# Patient Record
Sex: MALE | Race: WHITE | Employment: FULL TIME | URBAN - METROPOLITAN AREA
[De-identification: names, ages, dates, MRNs, and addresses within clinical notes are randomized per-mention and may not be internally consistent; named-entity substitution may affect disease eponyms.]

---

## 2017-08-07 ENCOUNTER — OFFICE VISIT (OUTPATIENT)
Dept: ORTHOPEDIC SURGERY | Age: 32
End: 2017-08-07

## 2017-08-07 VITALS
HEART RATE: 69 BPM | SYSTOLIC BLOOD PRESSURE: 131 MMHG | WEIGHT: 197 LBS | BODY MASS INDEX: 31.66 KG/M2 | HEIGHT: 66 IN | DIASTOLIC BLOOD PRESSURE: 103 MMHG

## 2017-08-07 DIAGNOSIS — M25.552 PAIN OF BOTH HIP JOINTS: Primary | ICD-10-CM

## 2017-08-07 DIAGNOSIS — M25.551 PAIN OF BOTH HIP JOINTS: Primary | ICD-10-CM

## 2017-08-07 NOTE — PATIENT INSTRUCTIONS
Broadlink Activation    Thank you for requesting access to Broadlink. Please follow the instructions below to securely access and download your online medical record. Broadlink allows you to send messages to your doctor, view your test results, renew your prescriptions, schedule appointments, and more. How Do I Sign Up? 1. In your internet browser, go to [email or web address]  2. Click on the First Time User? Click Here link in the Sign In box. You will be redirect to the New Member Sign Up page. 3. Enter your Broadlink Access Code exactly as it appears below. You will not need to use this code after youve completed the sign-up process. If you do not sign up before the expiration date, you must request a new code. Broadlink Access Code: [email or web address]  Expires: 2017  1:36 PM (This is the date your Broadlink access code will )    4. Enter the last four digits of your Social Security Number (xxxx) and Date of Birth (mm/dd/yyyy) as indicated and click Submit. You will be taken to the next sign-up page. 5. Create a Broadlink ID. This will be your Broadlink login ID and cannot be changed, so think of one that is secure and easy to remember. 6. Create a Broadlink password. You can change your password at any time. 7. Enter your Password Reset Question and Answer. This can be used at a later time if you forget your password. 8. Enter your e-mail address. You will receive e-mail notification when new information is available in [address] 9. Click Sign Up. You can now view and download portions of your medical record. 10. Click the Download Summary menu link to download a portable copy of your medical information. Additional Information    If you have questions, please visit the Frequently Asked Questions section of the Broadlink website at [email or web address] Remember, Broadlink is NOT to be used for urgent needs. For medical emergencies, dial 911.

## 2017-09-05 ENCOUNTER — OFFICE VISIT (OUTPATIENT)
Dept: ORTHOPEDIC SURGERY | Facility: CLINIC | Age: 32
End: 2017-09-05

## 2017-09-05 VITALS
RESPIRATION RATE: 17 BRPM | WEIGHT: 197.2 LBS | BODY MASS INDEX: 31.69 KG/M2 | HEIGHT: 66 IN | DIASTOLIC BLOOD PRESSURE: 88 MMHG | SYSTOLIC BLOOD PRESSURE: 126 MMHG | TEMPERATURE: 97.5 F | HEART RATE: 64 BPM | OXYGEN SATURATION: 98 %

## 2017-09-05 DIAGNOSIS — M25.551 RIGHT HIP PAIN: Primary | ICD-10-CM

## 2017-09-05 DIAGNOSIS — M54.42 CHRONIC BILATERAL LOW BACK PAIN WITH BILATERAL SCIATICA: ICD-10-CM

## 2017-09-05 DIAGNOSIS — G89.29 CHRONIC BILATERAL LOW BACK PAIN WITH BILATERAL SCIATICA: ICD-10-CM

## 2017-09-05 DIAGNOSIS — M54.41 CHRONIC BILATERAL LOW BACK PAIN WITH BILATERAL SCIATICA: ICD-10-CM

## 2017-09-05 DIAGNOSIS — M25.552 LEFT HIP PAIN: ICD-10-CM

## 2017-09-05 RX ORDER — CHOLECALCIFEROL (VITAMIN D3) 125 MCG
CAPSULE ORAL
COMMUNITY
End: 2019-07-17

## 2017-09-05 NOTE — PROGRESS NOTES
Patient: Zechariah Beckett                MRN: [id number]       SSN: [id number]  YOB: 1985        AGE: 28 y.o. SEX: male  Body mass index is 31.83 kg/(m^2). PCP: No primary care provider on file. 09/05/17  HISTORY: I had the pleasure of reviewing Erica Fothergill. Erica Fothergill had a fall. He was working on a truck and fell about 5 to 6 feet, right on the ladder. He complains of low back pain and also hip pain. It is sometimes in the groin as well. It is sometimes laterally based. He denies fevers or chills. What bothers him most is when he actually has to bend forward and pick something up. He does not have too much pain when he rolls over on it at night. No catching, locking or giving way with regards to the hip. He does get some persistent groin pain. He has known dysplasia of the hips as well. He never had any problems with them. Arthritis does not run in his family. He is otherwise feeling well. No numbness or tingling going down the legs. PHYSICAL EXAMINATION:  On examination, he walks perfectly normally. There is no antalgic component to the gait. The low back is tender at L4-5, which is actually the largest area of tenderness seen today. The hips actually rotate fairly well without much discomfort in the groin. The hip flexor is not particularly tender. With flexion, adduction and internal rotation, there is really not too much tenderness either. He is neurologically intact. Calf is nontender. He has good musculature. He is in good physical condition. RADIOGRAPHS:  On CT scan, he has a small what appears to be osteochondroma in the iliac wing, which the radiologist feels is benign. He does have some mild hip dysplasia and some mild cam impingement as well. IMPRESSION:  My overall impression is trauma with significant low back pain and groin discomfort. PLAN:  I do not think he is having mechanical symptoms.  I will get an MRI of both hips to make sure he does not have chondrolysis or AVN or occult lesion. We could consider an intraarticular injection associated with it for an MRI arthrogram, but I think we will stick with a plain MRI currently. I would like him to get reevaluated for his low back pain, which is where he says he is the most tender today. I will see him back after the MRI of his hips, and we will proceed from there. REVIEW OF SYSTEMS:      CON: negative for weight loss, fever  EYE: negative for double vision  ENT: negative for hoarseness  RS:   negative for Tb  GI:    negative for blood in stool  :  negative for blood in urine  Other systems reviewed and noted below. Past Medical History:   Diagnosis Date    Asthma        History reviewed. No pertinent family history. Current Outpatient Prescriptions   Medication Sig Dispense Refill    naproxen sodium (ALEVE) 220 mg cap Take  by mouth.  naproxen (NAPROSYN) 250 mg tablet Take  by mouth two (2) times daily (with meals).  HYDROcodone-acetaminophen (VICODIN) 5-500 mg per tablet Take  by mouth.  valACYclovir (VALTREX) 1 g tablet Take  by mouth.  fexofenadine (ALLEGRA) 30 mg tablet Take 30 mg by mouth two (2) times a day.  clindamycin (CLEOCIN T) 1 % external solution Apply  to affected area two (2) times a day. use thin film on affected area       fluticasone (FLONASE) 50 mcg/actuation nasal spray nightly. No Known Allergies    History reviewed. No pertinent surgical history. Social History     Social History    Marital status: SINGLE     Spouse name: N/A    Number of children: N/A    Years of education: N/A     Occupational History    Not on file.      Social History Main Topics    Smoking status: Never Smoker    Smokeless tobacco: Never Used    Alcohol use No    Drug use: Not on file    Sexual activity: Not on file     Other Topics Concern    Not on file     Social History Narrative       Visit Vitals    /88    Pulse 64    Temp 97.5 °F (36.4 °C) (Oral)    Resp 17    Ht 5' 6\" (1.676 m)    Wt 197 lb 3.2 oz (89.4 kg)    SpO2 98%    BMI 31.83 kg/m2         PHYSICAL EXAMINATION:  GENERAL: Alert and oriented x3, in no acute distress, well-developed, well-nourished, afebrile. HEART: No JVD. EYES: No scleral icterus   NECK: No significant lymphadenopathy   LUNGS: No respiratory compromise or indrawing  ABDOMEN: Soft, non-tender, non-distended. Electronically signed by:  Casandra Hernandez MD

## 2017-09-07 ENCOUNTER — OFFICE VISIT (OUTPATIENT)
Dept: ORTHOPEDIC SURGERY | Age: 32
End: 2017-09-07

## 2017-09-07 VITALS
DIASTOLIC BLOOD PRESSURE: 70 MMHG | HEIGHT: 66 IN | SYSTOLIC BLOOD PRESSURE: 140 MMHG | WEIGHT: 193 LBS | TEMPERATURE: 98.1 F | BODY MASS INDEX: 31.02 KG/M2 | RESPIRATION RATE: 16 BRPM | OXYGEN SATURATION: 97 % | HEART RATE: 56 BPM

## 2017-09-07 DIAGNOSIS — M79.18 MYOFASCIAL PAIN: Primary | ICD-10-CM

## 2017-09-07 RX ORDER — BUPIVACAINE HYDROCHLORIDE 2.5 MG/ML
4 INJECTION, SOLUTION INFILTRATION; PERINEURAL ONCE
Qty: 4 ML | Refills: 0
Start: 2017-09-07 | End: 2017-09-07

## 2017-09-07 RX ORDER — BETAMETHASONE SODIUM PHOSPHATE AND BETAMETHASONE ACETATE 3; 3 MG/ML; MG/ML
12 INJECTION, SUSPENSION INTRA-ARTICULAR; INTRALESIONAL; INTRAMUSCULAR; SOFT TISSUE ONCE
Qty: 2 ML | Refills: 0
Start: 2017-09-07 | End: 2017-09-07

## 2017-09-07 RX ORDER — LIDOCAINE HYDROCHLORIDE 20 MG/ML
4 INJECTION, SOLUTION EPIDURAL; INFILTRATION; INTRACAUDAL; PERINEURAL ONCE
Qty: 4 ML | Refills: 0
Start: 2017-09-07 | End: 2017-09-07

## 2017-09-07 NOTE — PROGRESS NOTES
[address] [facility],[address]  [place], [address]  Phone: [phone]  Fax: [phone]        Germán Huang  : 1985  PCP: No primary care provider on file. 2017    NEW PATIENT      ASSESSMENT AND PLAN     Zander Welsh comes in to the office today c/o low back pain. His symptoms are likely myofascial in nature. On the examination he had localized tenderness immediately superior to the PSIS. I did dry needling in the office today which provided good twitch responses. I also did a trigger point injection that provided immediate relief. I referred him to PT with dry needling and a [facility] based program.     Pt will f/u in 6 weeks or sooner if needed. Diagnoses and all orders for this visit:    1. Myofascial pain  -     bupivacaine (MARCAINE) 0.25 % (2.5 mg/mL) soln injection; 4 mL by SubCUTAneous route once for 1 dose. -     INJECTION, BUPIVICAINE HYDRO  -     LIDOCAINE INJECTION  -     lidocaine, PF, (XYLOCAINE) 20 mg/mL (2 %) injection; 4 mL by Other route once for 1 dose. -     betamethasone (CELESTONE SOLUSPAN) 6 mg/mL injection; 2 mL by IntraMUSCular route once for 1 dose.  -     BETAMETHASONE ACETATE & SODIUM PHOSPHATE INJECTION 6 MG  -     05531 - INJECT TRIGGER POINTS, > 3  -     REFERRAL TO PHYSICAL THERAPY       Follow-up Disposition:  Return in about 6 weeks (around 10/19/2017), or if symptoms worsen or fail to improve. CHIEF COMPLAINT  Zander Welsh is seen today in consultation at the request of No primary care provider on file. for complaints of low back pain. HISTORY OF PRESENT ILLNESS  Zander Welsh is a 28 y.o. male c/o pain in the lower lumbar region due to a fall at work in 2016. He states that he was on a ladder approximately 5 feet in the air when a step broke and he backwards onto a ladder. He reports that his pain is worse with bending forward.  He was treated by Dr. Aidan Caraballo who informed him that his symptoms were due to hip dysplasia. He was also evaluated by [facility] who treated him to PT with approximaetly 16 sessions which did not improve his symptoms. He reports a tingling sensation along the anterior aspect of the right lower extremity. He recently had a thoracic and lumbar MRI which did not show significant evidence of radiculopathy or stenosis. Pt denies any fevers, chills, nausea, vomiting. Pt denies any chest pain and shortness of breath. Pt denies any ear, nose, and throat problems. Pt denies any fecal or urinary incontinence. 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 PAST MEDICAL HISTORY   Past Medical History:   Diagnosis Date    Asthma        No past surgical history on file. MEDICATIONS      Current Outpatient Prescriptions   Medication Sig Dispense Refill    naproxen sodium (ALEVE) 220 mg cap Take  by mouth.  naproxen (NAPROSYN) 250 mg tablet Take  by mouth two (2) times daily (with meals).  HYDROcodone-acetaminophen (VICODIN) 5-500 mg per tablet Take  by mouth.  valACYclovir (VALTREX) 1 g tablet Take  by mouth.  fexofenadine (ALLEGRA) 30 mg tablet Take 30 mg by mouth two (2) times a day.  clindamycin (CLEOCIN T) 1 % external solution Apply  to affected area two (2) times a day. use thin film on affected area       fluticasone (FLONASE) 50 mcg/actuation nasal spray nightly. ALLERGIES  No Known Allergies       SOCIAL HISTORY    Social History     Social History    Marital status: SINGLE     Spouse name: N/A    Number of children: N/A    Years of education: N/A     Social History Main Topics    Smoking status: Never Smoker    Smokeless tobacco: Never Used    Alcohol use No    Drug use: None    Sexual activity: Not Asked     Other Topics Concern    None     Social History Narrative       FAMILY HISTORY  No family history on file.       REVIEW OF SYSTEMS  Review of Systems Constitutional: Negative for chills, diaphoresis, fever, malaise/fatigue and weight loss. Respiratory: Negative for shortness of breath. Cardiovascular: Negative for chest pain and leg swelling. Gastrointestinal: Negative for constipation, nausea and vomiting. Neurological: Negative for dizziness, tingling, seizures, loss of consciousness, weakness and headaches. Psychiatric/Behavioral: The patient does not have insomnia. PHYSICAL EXAMINATION  Visit Vitals    /70 (BP 1 Location: Left arm, BP Patient Position: Sitting)    Pulse (!) 56    Temp 98.1 °F (36.7 °C) (Oral)    Resp 16    Ht 5' 6\" (1.676 m)    Wt 193 lb (87.5 kg)    SpO2 97%    BMI 31.15 kg/m2         Pain Assessment  9/5/2017   Location of Pain Hip   Location Modifiers Right   Severity of Pain 5   Quality of Pain Aching;Popping;Burning   Quality of Pain Comment -   Duration of Pain Persistent   Frequency of Pain Constant   Aggravating Factors Walking;Standing;Bending   Limiting Behavior Yes   Relieving Factors Nothing   Result of Injury Yes   Work-Related Injury Yes   Type of Injury Fall         Constitutional:  Well developed, well nourished, in no acute distress. Psychiatric: Affect and mood are appropriate. HEENT: Normocephalic, atraumatic. Extraocular movements intact. Integumentary: No rashes or abrasions noted on exposed areas. Cardiovascular: Regular rate and rhythm. Pulmonary: Clear to auscultation bilaterally. SPINE/MUSCULOSKELETAL EXAM    Cervical spine:  Neck is midline. Normal muscle tone. No focal atrophy is noted. ROM pain free. Shoulder ROM intact. No tenderness to palpation. Negative Spurling's sign. Negative Tinel's sign. Negative Campbell's sign. Sensation in the bilateral arms grossly intact to light touch. Lumbar spine:  No rash, ecchymosis, or gross obliquity. No fasciculations. No focal atrophy is noted. No pain with hip ROM.    Full range of motion. Tenderness to palpation  No tenderness to palpation at the sciatic notch. SI joints non-tender. Negative DAVID test   Positive right P4 test   Negative Compression test  Positive Distraction test  Trochanters non tender. Sensation in the bilateral legs grossly intact to light touch. MOTOR:      Biceps  Triceps Deltoids Wrist Ext Wrist Flex Hand Intrin   Right 5/5 5/5 5/5 5/5 5/5 5/5   Left 5/5 5/5 5/5 5/5 5/5 5/5             Hip Flex  Quads Hamstrings Ankle DF EHL Ankle PF   Right 5/5 5/5 5/5 5/5 5/5 5/5   Left 5/5 5/5 5/5 5/5 5/5 5/5     DTRs are 2+ biceps, triceps, brachioradialis, patella, and Achilles. Negative Straight Leg raise. Squat not tested. No difficulty with tandem gait. Ambulation without assistive device. FWB. RADIOGRAPHS  Thoracic MRI images taken on 02/17/2017 personally reviewed with patient:  Impression:   1. No acute compression deformity  2. Normal spinal cord signal.   3. No significant central canal or foraminal stenosis at any level. Lumbar MRI images taken on 02/17/2017 personally reviewed with patient:  Impression:   No focal disc protrusion. No significant central canal or foraminal stenosis at any level.  reviewed    Mr. Sanjeev Salcedo has a reminder for a \"due or due soon\" health maintenance. I have asked that he contact his primary care provider for follow-up on this health maintenance. This plan was reviewed with the patient and patient agrees. All questions were answered. More than half of this visit today was spent on counseling. Written by Ayush Cadena, as dictated by Dr. Rafael Jon. I, Dr. Rafael Jon, confirm that all documentation is accurate.

## 2017-09-07 NOTE — MR AVS SNAPSHOT
Visit Information Date & Time Provider Department Dept. Phone Encounter #  
 9/7/2017  8:45 AM Jolie Paredes MD [facility] [phone] [id number] Follow-up Instructions Return in about 6 weeks (around 10/19/2017), or if symptoms worsen or fail to improve. Upcoming Health Maintenance Date Due Pneumococcal 19-64 Medium Risk (1 of 1 - PPSV23) 3/2/2004 DTaP/Tdap/Td series (1 - Tdap) 3/2/2006 INFLUENZA AGE 9 TO ADULT 8/1/2017 Allergies as of 9/7/2017  Review Complete On: 9/7/2017 By: Sadiq Wilkins LPN No Known Allergies Current Immunizations  Never Reviewed No immunizations on file. Not reviewed this visit You Were Diagnosed With   
  
 Codes Comments Myofascial pain    -  Primary ICD-10-CM: M79.1 ICD-9-CM: 729.1 Vitals BP Pulse Temp Resp Height(growth percentile) Weight(growth percentile) 140/70 (BP 1 Location: Left arm, BP Patient Position: Sitting) (!) 56 98.1 °F (36.7 °C) (Oral) 16 5' 6\" (1.676 m) 193 lb (87.5 kg) SpO2 BMI Smoking Status 97% 31.15 kg/m2 Never Smoker BMI and BSA Data Body Mass Index Body Surface Area  
 31.15 kg/m 2 2.02 m 2 Preferred Pharmacy Pharmacy Name Phone [address], [address] [place] [address] [phone] Your Updated Medication List  
  
   
This list is accurate as of: 9/7/17 10:33 AM.  Always use your most recent med list.  
  
  
  
  
 ALEVE 220 mg Cap Generic drug:  naproxen sodium Take  by mouth. betamethasone 6 mg/mL injection Commonly known as:  CELESTONE SOLUSPAN  
2 mL by IntraMUSCular route once for 1 dose. bupivacaine 0.25 % (2.5 mg/mL) Soln injection Commonly known as:  MARCAINE  
4 mL by SubCUTAneous route once for 1 dose. clindamycin 1 % external solution Commonly known as:  CLEOCIN T  
 Apply  to affected area two (2) times a day. use thin film on affected area  
  
 fexofenadine 30 mg tablet Commonly known as:  Maude Lapidus Take 30 mg by mouth two (2) times a day. FLONASE 50 mcg/actuation nasal spray Generic drug:  fluticasone  
nightly. lidocaine (PF) 20 mg/mL (2 %) injection Commonly known as:  XYLOCAINE  
4 mL by Other route once for 1 dose. NAPROSYN 250 mg tablet Generic drug:  naproxen Take  by mouth two (2) times daily (with meals). VALTREX 1 gram tablet Generic drug:  valACYclovir Take  by mouth. VICODIN 5-500 mg per tablet Generic drug:  HYDROcodone-acetaminophen Take  by mouth. We Performed the Following BETAMETHASONE ACETATE & SODIUM PHOSPHATE INJECTION 3 MG EA. [ [facility]] INJECTION, BUPIVICAINE HYDRO [ [facility]] LIDOCAINE INJECTION [ [facility]] MT INJECT TRIGGER POINTS, > 3 [id number] CPT(R)] REFERRAL TO PHYSICAL THERAPY [GSS62 Custom] Comments:  
 eval and treat; piliates based; dry needling Pt has myofascial pain in the lumbar region. Follow-up Instructions Return in about 6 weeks (around 10/19/2017), or if symptoms worsen or fail to improve. Referral Information Referral ID Referred By Referred To  
  
 [id number] WILDER Ramirez Not Available Visits Status Start Date End Date 1 New Request 9/7/17 9/7/18 If your referral has a status of pending review or denied, additional information will be sent to support the outcome of this decision. Introducing [facility]! [facility] introduces [facility] patient portal. Now you can access parts of your medical record, email your doctor's office, and request medication refills online. 1. In your internet browser, go to [email or web address] 2. Click on the First Time User? Click Here link in the Sign In box. You will see the New Member Sign Up page. 3. Enter your Scloby Access Code exactly as it appears below. You will not need to use this code after youve completed the sign-up process. If you do not sign up before the expiration date, you must request a new code. · Scloby Access Code: [email or web address] Expires: 11/5/2017  1:36 PM 
 
4. Enter the last four digits of your Social Security Number (xxxx) and Date of Birth (mm/dd/yyyy) as indicated and click Submit. You will be taken to the next sign-up page. 5. Create a Scloby ID. This will be your Scloby login ID and cannot be changed, so think of one that is secure and easy to remember. 6. Create a Scloby password. You can change your password at any time. 7. Enter your Password Reset Question and Answer. This can be used at a later time if you forget your password. 8. Enter your e-mail address. You will receive e-mail notification when new information is available in [address] 9. Click Sign Up. You can now view and download portions of your medical record. 10. Click the Download Summary menu link to download a portable copy of your medical information. If you have questions, please visit the Frequently Asked Questions section of the Scloby website. Remember, Scloby is NOT to be used for urgent needs. For medical emergencies, dial 911. Now available from your iPhone and Android! Please provide this summary of care documentation to your next provider. If you have any questions after today's visit, please call [phone].

## 2017-09-12 ENCOUNTER — TELEPHONE (OUTPATIENT)
Dept: ORTHOPEDIC SURGERY | Age: 32
End: 2017-09-12

## 2017-09-12 NOTE — TELEPHONE ENCOUNTER
Requested auth from [address] [place] [address]) W/C for PT,, faxed order and office notes to her.   Auth pending

## 2017-10-19 ENCOUNTER — OFFICE VISIT (OUTPATIENT)
Dept: ORTHOPEDIC SURGERY | Age: 32
End: 2017-10-19

## 2017-10-19 VITALS
WEIGHT: 193 LBS | BODY MASS INDEX: 31.02 KG/M2 | HEIGHT: 66 IN | DIASTOLIC BLOOD PRESSURE: 83 MMHG | OXYGEN SATURATION: 98 % | SYSTOLIC BLOOD PRESSURE: 132 MMHG | RESPIRATION RATE: 18 BRPM | HEART RATE: 74 BPM | TEMPERATURE: 98.1 F

## 2017-10-19 DIAGNOSIS — M54.50 LUMBAR SPINE PAIN: ICD-10-CM

## 2017-10-19 DIAGNOSIS — M79.18 MYOFASCIAL PAIN: Primary | ICD-10-CM

## 2017-10-19 RX ORDER — GABAPENTIN 300 MG/1
300 CAPSULE ORAL 3 TIMES DAILY
Qty: 90 CAP | Refills: 2 | Status: SHIPPED | OUTPATIENT
Start: 2017-10-19 | End: 2019-07-17

## 2017-10-19 RX ORDER — PREDNISONE 10 MG/1
10 TABLET ORAL SEE ADMIN INSTRUCTIONS
Qty: 21 TAB | Refills: 0 | Status: SHIPPED | OUTPATIENT
Start: 2017-10-19 | End: 2017-11-09 | Stop reason: ALTCHOICE

## 2017-10-19 NOTE — LETTER
NOTIFICATION RETURN TO WORK 
 
10/19/2017 9:09 AM 
 
Mr. Shmuel Adamson [address] [address] To Whom It May Concern: 
 
Shmuel Adamson is currently under the care of [facility]. He was evaluated in our office on 10/19/2017. If there are questions or concerns please have the patient contact our office.  
 
 
 
Sincerely, 
 
 
Billy Velasquez MD

## 2017-10-19 NOTE — PATIENT INSTRUCTIONS
Gabapentin (By mouth)   Gabapentin (leila-a-PEN-tin)  Treats seizures and pain caused by shingles. Brand Name(s): ACTIVE-PAC with Gabapentin, Convenience Macho, Cyclo/Babs 10/300 Pack, FusePaq Fanatrex, Babs-V, Gralise, [address] Pack, Neurontin, SmartRx Babs Kit   There may be other brand names for this medicine. When This Medicine Should Not Be Used: This medicine is not right for everyone. Do not use it if you had an allergic reaction to gabapentin. How to Use This Medicine:   Capsule, Liquid, Tablet  · Take your medicine as directed. Your dose may need to be changed several times to find what works best for you. If you have epilepsy, do not allow more than 12 hours to pass between doses. · Capsule: Swallow the capsule whole with plenty of water. Do not open, crush, or chew it. · Gralise® tablet: Swallow the tablet whole . Do not crush, break, or chew it. · Neurontin® tablet: If you break a tablet into 2 pieces, use the second half as your next dose. If you don't use it within 28 days, throw it away. · Measure the oral liquid medicine with a marked measuring spoon, oral syringe, or medicine cup. · This medicine should come with a Medication Guide. Ask your pharmacist for a copy if you do not have one. · Missed dose: Take a dose as soon as you remember. If it is almost time for your next dose, wait until then and take a regular dose. Do not take extra medicine to make up for a missed dose. · Store the medicine in a closed container at room temperature, away from heat, moisture, and direct light. Store the Neurontin® oral liquid in the refrigerator. Do not freeze. Drugs and Foods to Avoid:   Ask your doctor or pharmacist before using any other medicine, including over-the-counter medicines, vitamins, and herbal products. · Some medicines can affect how gabapentin works.  Tell your doctor if you also use any of the following:   ¨ Hydrocodone  ¨ Morphine  · If you take an antacid, wait at least 2 hours before you take gabapentin. · Tell your doctor if you use anything else that makes you sleepy. Some examples are allergy medicine, narcotic pain medicine, and alcohol. Warnings While Using This Medicine:   · Tell your doctor if you are pregnant or breastfeeding, or if you have kidney problems or are receiving dialysis. Tell your doctor if you have a history of depression or mental health problems. · This medicine may increase depression or thoughts of suicide. Tell your doctor right away if you start to feel more depressed or think about hurting yourself. · This medicine may cause a serious allergic reaction called multiorgan hypersensitivity, which can damage organs and be life-threatening. · Do not stop using this medicine suddenly. Your doctor will need to slowly decrease your dose before you stop it completely. If you take this medicine to prevent seizures, your seizures may return or occur more often if you stop this medicine suddenly. · This medicine may make you dizzy or drowsy. Do not drive or do anything else that could be dangerous until you know how this medicine affects you. · Tell any doctor or dentist who treats you that you are using this medicine. This medicine may affect certain medical test results. · Your doctor will check your progress and the effects of this medicine at regular visits. Keep all appointments. · Keep all medicine out of the reach of children. Never share your medicine with anyone.   Possible Side Effects While Using This Medicine:   Call your doctor right away if you notice any of these side effects:  · Allergic reaction: Itching or hives, swelling in your face or hands, swelling or tingling in your mouth or throat, chest tightness, trouble breathing  · Behavior problems, aggression, restlessness, trouble concentrating, moodiness (especially in children)  · Blistering, peeling, red skin rash  · Change in how much or how often you urinate, bloody or cloudy urine,  · Chest pain, fast heartbeat, trouble breathing  · Dark urine or pale stools, nausea, vomiting, loss of appetite, stomach pain, yellow skin or eyes  · Fever, rash, swollen or tender glands in the neck, armpit, or groin  · Problems with coordination, shakiness, unsteadiness  · Rapid weight gain, swelling in your hands, ankles, or feet  · Unusual moods or behaviors, thoughts of hurting yourself, feeling depressed  If you notice these less serious side effects, talk with your doctor:   · Dizziness, drowsiness, sleepiness, tiredness  If you notice other side effects that you think are caused by this medicine, tell your doctor. Call your doctor for medical advice about side effects. You may report side effects to FDA at 1-628-FDA-4328  © 2017 [address] [employer] Information is for End User's use only and may not be sold, redistributed or otherwise used for commercial purposes. The above information is an  only. It is not intended as medical advice for individual conditions or treatments. Talk to your doctor, nurse or pharmacist before following any medical regimen to see if it is safe and effective for you. Gabapentin (Neurontin) 300 mg three times a day (TID) daily instructions:       Morning Afternoon Night   Week 1 - - 1 pill   Week 2 1 pill - 1 pill   Week 3 and onwards 1 pill 1 pill 1 pill     Week 1: Take one pill each night. Week 2: Take one pill in the morning and one pill at night. Week 3 and onwards: Take one pill in the morning, one pill in the afternoon, and one pill at night. Continue taking this dose each day.

## 2017-10-19 NOTE — MR AVS SNAPSHOT
Visit Information Date & Time Provider Department Dept. Phone Encounter #  
 10/19/2017  8:30 AM Ani Trujillo MD [facility]  [id number] Follow-up Instructions Return in about 3 weeks (around 11/9/2017), or if symptoms worsen or fail to improve. Follow-up and Disposition History Upcoming Health Maintenance Date Due Pneumococcal 19-64 Medium Risk (1 of 1 - PPSV23) 3/2/2004 DTaP/Tdap/Td series (1 - Tdap) 3/2/2006 INFLUENZA AGE 9 TO ADULT 8/1/2017 Allergies as of 10/19/2017  Review Complete On: 10/19/2017 By: Ani Trujillo MD  
 No Known Allergies Current Immunizations  Never Reviewed No immunizations on file. Not reviewed this visit You Were Diagnosed With   
  
 Codes Comments Myofascial pain    -  Primary ICD-10-CM: M79.1 ICD-9-CM: 729.1 Lumbar spine pain     ICD-10-CM: M54.5 ICD-9-CM: 724.2 Vitals BP Pulse Temp Resp Height(growth percentile) Weight(growth percentile) 132/83 74 98.1 °F (36.7 °C) (Oral) 18 5' 6\" (1.676 m) 193 lb (87.5 kg) SpO2 BMI Smoking Status 98% 31.15 kg/m2 Never Smoker BMI and BSA Data Body Mass Index Body Surface Area  
 31.15 kg/m 2 2.02 m 2 Preferred Pharmacy Pharmacy Name Phone [address], [address] [place] [address] [phone] Your Updated Medication List  
  
   
This list is accurate as of: 10/19/17  9:19 AM.  Always use your most recent med list.  
  
  
  
  
 ALEVE 220 mg Cap Generic drug:  naproxen sodium Take  by mouth. clindamycin 1 % external solution Commonly known as:  CLEOCIN T Apply  to affected area two (2) times a day. use thin film on affected area  
  
 fexofenadine 30 mg tablet Commonly known as:  Matteoivy Acevedo Take 30 mg by mouth two (2) times a day. FLONASE 50 mcg/actuation nasal spray Generic drug:  fluticasone  
nightly. gabapentin 300 mg capsule Commonly known as:  NEURONTIN Take 1 Cap by mouth three (3) times daily. NAPROSYN 250 mg tablet Generic drug:  naproxen Take  by mouth two (2) times daily (with meals). predniSONE 10 mg dose pack Commonly known as:  STERAPRED DS Take 1 Tab by mouth See Admin Instructions. See administration instruction per 10mg dose pack VALTREX 1 gram tablet Generic drug:  valACYclovir Take  by mouth. VICODIN 5-500 mg per tablet Generic drug:  HYDROcodone-acetaminophen Take  by mouth. Prescriptions Sent to Pharmacy Refills  
 gabapentin (NEURONTIN) 300 mg capsule 2 Sig: Take 1 Cap by mouth three (3) times daily. Class: Normal  
 Pharmacy: [address] Ph #: [phone] Route: Oral  
 predniSONE (STERAPRED DS) 10 mg dose pack 0 Sig: Take 1 Tab by mouth See Admin Instructions. See administration instruction per 10mg dose pack Class: Normal  
 Pharmacy: [address] Ph #: [phone] Route: Oral  
  
Follow-up Instructions Return in about 3 weeks (around 11/9/2017), or if symptoms worsen or fail to improve. Patient Instructions Gabapentin (By mouth) Gabapentin (leila-a-PEN-tin) Treats seizures and pain caused by shingles. Brand Name(s): ACTIVE-PAC with Gabapentin, Convenience Macho, Cyclo/Babs 10/300 Pack, [employer], Babs-V, Gralise, Gralise Starter Pack, Neurontin, [employer] Kit There may be other brand names for this medicine. When This Medicine Should Not Be Used: This medicine is not right for everyone. Do not use it if you had an allergic reaction to gabapentin. How to Use This Medicine:  
Capsule, Liquid, Tablet · Take your medicine as directed. Your dose may need to be changed several times to find what works best for you.  If you have epilepsy, do not allow more than 12 hours to pass between doses. · Capsule: Swallow the capsule whole with plenty of water. Do not open, crush, or chew it. · Gralise® tablet: Swallow the tablet whole . Do not crush, break, or chew it. · Neurontin® tablet: If you break a tablet into 2 pieces, use the second half as your next dose. If you don't use it within 28 days, throw it away. · Measure the oral liquid medicine with a marked measuring spoon, oral syringe, or medicine cup. · This medicine should come with a Medication Guide. Ask your pharmacist for a copy if you do not have one. · Missed dose: Take a dose as soon as you remember. If it is almost time for your next dose, wait until then and take a regular dose. Do not take extra medicine to make up for a missed dose. · Store the medicine in a closed container at room temperature, away from heat, moisture, and direct light. Store the Neurontin® oral liquid in the refrigerator. Do not freeze. Drugs and Foods to Avoid: Ask your doctor or pharmacist before using any other medicine, including over-the-counter medicines, vitamins, and herbal products. · Some medicines can affect how gabapentin works. Tell your doctor if you also use any of the following: ¨ Hydrocodone ¨ Morphine · If you take an antacid, wait at least 2 hours before you take gabapentin. · Tell your doctor if you use anything else that makes you sleepy. Some examples are allergy medicine, narcotic pain medicine, and alcohol. Warnings While Using This Medicine: · Tell your doctor if you are pregnant or breastfeeding, or if you have kidney problems or are receiving dialysis. Tell your doctor if you have a history of depression or mental health problems. · This medicine may increase depression or thoughts of suicide. Tell your doctor right away if you start to feel more depressed or think about hurting yourself.  
· This medicine may cause a serious allergic reaction called multiorgan hypersensitivity, which can damage organs and be life-threatening. · Do not stop using this medicine suddenly. Your doctor will need to slowly decrease your dose before you stop it completely. If you take this medicine to prevent seizures, your seizures may return or occur more often if you stop this medicine suddenly. · This medicine may make you dizzy or drowsy. Do not drive or do anything else that could be dangerous until you know how this medicine affects you. · Tell any doctor or dentist who treats you that you are using this medicine. This medicine may affect certain medical test results. · Your doctor will check your progress and the effects of this medicine at regular visits. Keep all appointments. · Keep all medicine out of the reach of children. Never share your medicine with anyone. Possible Side Effects While Using This Medicine:  
Call your doctor right away if you notice any of these side effects: · Allergic reaction: Itching or hives, swelling in your face or hands, swelling or tingling in your mouth or throat, chest tightness, trouble breathing · Behavior problems, aggression, restlessness, trouble concentrating, moodiness (especially in children) · Blistering, peeling, red skin rash · Change in how much or how often you urinate, bloody or cloudy urine, 
· Chest pain, fast heartbeat, trouble breathing · Dark urine or pale stools, nausea, vomiting, loss of appetite, stomach pain, yellow skin or eyes · Fever, rash, swollen or tender glands in the neck, armpit, or groin · Problems with coordination, shakiness, unsteadiness · Rapid weight gain, swelling in your hands, ankles, or feet · Unusual moods or behaviors, thoughts of hurting yourself, feeling depressed If you notice these less serious side effects, talk with your doctor: · Dizziness, drowsiness, sleepiness, tiredness If you notice other side effects that you think are caused by this medicine, tell your doctor. Call your doctor for medical advice about side effects. You may report side effects to FDA at 7-926-FDA-5763 © 2017 [address] [employer] Information is for End User's use only and may not be sold, redistributed or otherwise used for commercial purposes. The above information is an  only. It is not intended as medical advice for individual conditions or treatments. Talk to your doctor, nurse or pharmacist before following any medical regimen to see if it is safe and effective for you. Gabapentin (Neurontin) 300 mg three times a day (TID) daily instructions: 
 
 
 Morning Afternoon Night Week 1 - - 1 pill Week 2 1 pill - 1 pill Week 3 and onwards 1 pill 1 pill 1 pill Week 1: Take one pill each night. Week 2: Take one pill in the morning and one pill at night. Week 3 and onwards: Take one pill in the morning, one pill in the afternoon, and one pill at night. Continue taking this dose each day. Introducing [facility]! Erick Fernández introduces Swype patient portal. Now you can access parts of your medical record, email your doctor's office, and request medication refills online. 1. In your internet browser, go to [email or web address] 2. Click on the First Time User? Click Here link in the Sign In box. You will see the New Member Sign Up page. 3. Enter your Swype Access Code exactly as it appears below. You will not need to use this code after youve completed the sign-up process. If you do not sign up before the expiration date, you must request a new code. · Swype Access Code: [email or web address] Expires: 11/5/2017  1:36 PM 
 
4. Enter the last four digits of your Social Security Number (xxxx) and Date of Birth (mm/dd/yyyy) as indicated and click Submit. You will be taken to the next sign-up page. 5. Create a Swype ID.  This will be your Swype login ID and cannot be changed, so think of one that is secure and easy to remember. 6. Create a Wi[employer]People password. You can change your password at any time. 7. Enter your Password Reset Question and Answer. This can be used at a later time if you forget your password. 8. Enter your e-mail address. You will receive e-mail notification when new information is available in [address] 9. Click Sign Up. You can now view and download portions of your medical record. 10. Click the Download Summary menu link to download a portable copy of your medical information. If you have questions, please visit the Frequently Asked Questions section of the [employer] website. Remember, [employer]People is NOT to be used for urgent needs. For medical emergencies, dial 911. Now available from your iPhone and Android! Please provide this summary of care documentation to your next provider. If you have any questions after today's visit, please call [phone].

## 2017-10-19 NOTE — PROGRESS NOTES
[address], [address]  [address]  Phone: [phone]  Fax: [phone]        Cortez Leroy  : 1985  PCP: No primary care provider on file. 10/19/2017    PROGRESS NOTE      ASSESSMENT AND PLAN    Ayla Saavedra comes in to the office today for a PT f/u. His symptoms continue to present as myofascial pain. On the examination today, he had a negative sitting slump test and straight leg raise. He continues to have radicular symptoms that could be related to lumbar radiculopathy or myofascial pain with radiation from trigger points. Given that his symptoms are worse through the night, this could represent nerve irritation. Lumbar MRI is not concordant with this diagnosis. I prescribed him a Prednisone 10 mg dose pack and Gabapentin 300 mg TID. He will also continue with the current plan of care at PT. Pt will f/u in 3 weeks or sooner as needed. Diagnoses and all orders for this visit:    1. Myofascial pain  -     gabapentin (NEURONTIN) 300 mg capsule; Take 1 Cap by mouth three (3) times daily. -     predniSONE (STERAPRED DS) 10 mg dose pack; Take 1 Tab by mouth See Admin Instructions. See administration instruction per 10mg dose pack    2. Lumbar spine pain  -     gabapentin (NEURONTIN) 300 mg capsule; Take 1 Cap by mouth three (3) times daily. -     predniSONE (STERAPRED DS) 10 mg dose pack; Take 1 Tab by mouth See Admin Instructions. See administration instruction per 10mg dose pack         Follow-up Disposition:  Return in about 3 weeks (around 2017), or if symptoms worsen or fail to improve. HISTORY OF PRESENT ILLNESS  Ayla Saavedra is a 28 y.o. male. A&P / HPI from 2017:  Ayla Saavedra comes in to the office today c/o low back pain.      His symptoms are likely myofascial in nature.  On the examination he had localized tenderness immediately superior to the PSIS.      I did dry needling in the office today which provided good twitch responses. I also did a trigger point injection that provided immediate relief. I referred him to PT with dry needling and a [facility] based program.     HISTORY OF PRESENT ILLNESS  Margoth Geronimo is a 28 y.o. male c/o pain in the lower lumbar region due to a fall at work in December 2016. He states that he was on a ladder approximately 5 feet in the air when a step broke and he backwards onto a ladder. He reports that his pain is worse with bending forward. He was treated by [facility] SARAH CHOWDHURY [facility] who informed him that his symptoms were due to hip dysplasia. He was also evaluated by Santhosh Ha who treated him to PT with approximaetly 16 sessions which did not improve his symptoms. He reports a tingling sensation along the anterior aspect of the right lower extremity. He recently had a thoracic and lumbar MRI which did not show significant evidence of radiculopathy or stenosis.      Pt denies any fevers, chills, nausea, vomiting. Pt denies any chest pain and shortness of breath. Pt denies any ear, nose, and throat problems. Pt denies any fecal or urinary incontinence.      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        Updates from 10/19/17:  Pt presents for a PT f/u. He just completed his PT eval. He reports the exercises provided immediate relief. He reports his symptoms have not changed since his last office visit. The dry needling increased his pain for approximately 3 days and then provided moderate relief. He describes his relief as \"numbness. \" He reports radicular symptoms along the posterior aspect of the right lower extremity that is worse when he is sleeping. PAST MEDICAL HISTORY   Past Medical History:   Diagnosis Date    Asthma        No past surgical history on file. Ethelene Gauss MEDICATIONS      Current Outpatient Prescriptions   Medication Sig Dispense Refill    naproxen sodium (ALEVE) 220 mg cap Take  by mouth.       naproxen (NAPROSYN) 250 mg tablet Take  by mouth two (2) times daily (with meals).  HYDROcodone-acetaminophen (VICODIN) 5-500 mg per tablet Take  by mouth.  valACYclovir (VALTREX) 1 g tablet Take  by mouth.  fexofenadine (ALLEGRA) 30 mg tablet Take 30 mg by mouth two (2) times a day.  clindamycin (CLEOCIN T) 1 % external solution Apply  to affected area two (2) times a day. use thin film on affected area       fluticasone (FLONASE) 50 mcg/actuation nasal spray nightly. ALLERGIES  No Known Allergies       SOCIAL HISTORY    Social History     Social History    Marital status: SINGLE     Spouse name: N/A    Number of children: N/A    Years of education: N/A     Social History Main Topics    Smoking status: Never Smoker    Smokeless tobacco: Never Used    Alcohol use No    Drug use: Not on file    Sexual activity: Not on file     Other Topics Concern    Not on file     Social History Narrative       FAMILY HISTORY  No family history on file. REVIEW OF SYSTEMS  Review of Systems   Constitutional: Negative for chills, diaphoresis, fever, malaise/fatigue and weight loss. Respiratory: Negative for shortness of breath. Cardiovascular: Negative for chest pain and leg swelling. Gastrointestinal: Negative for constipation, nausea and vomiting. Neurological: Negative for dizziness, tingling, seizures, loss of consciousness, weakness and headaches. Psychiatric/Behavioral: The patient does not have insomnia.            PHYSICAL EXAMINATION  Visit Vitals    /83    Pulse 74    Temp 98.1 °F (36.7 °C) (Oral)    Resp 18    Ht 5' 6\" (1.676 m)    Wt 193 lb (87.5 kg)    SpO2 98%    BMI 31.15 kg/m2       Pain Assessment  9/5/2017   Location of Pain Hip   Location Modifiers Right   Severity of Pain 5   Quality of Pain Aching;Popping;Burning   Quality of Pain Comment -   Duration of Pain Persistent   Frequency of Pain Constant   Aggravating Factors Walking;Standing;Bending   Limiting Behavior Yes   Relieving Factors Nothing   Result of Injury Yes   Work-Related Injury Yes   Type of Injury Fall           Constitutional:  Well developed, well nourished, in no acute distress. Psychiatric: Affect and mood are appropriate. Integumentary: No rashes or abrasions noted on exposed areas. SPINE/MUSCULOSKELETAL EXAM    Cervical spine:  Neck is midline. Normal muscle tone. No focal atrophy is noted. ROM pain free. Shoulder ROM intact. No tenderness to palpation. Negative Spurling's sign. Negative Tinel's sign. Negative Campbell's sign.       Sensation in the bilateral arms grossly intact to light touch.      Lumbar spine:  No rash, ecchymosis, or gross obliquity. No fasciculations. No focal atrophy is noted. No pain with hip ROM. Full range of motion. Tenderness to palpation  No tenderness to palpation at the sciatic notch. SI joints non-tender. Negative DAVID test   Positive right P4 test   Negative Compression test  Positive Distraction test  Trochanters non tender.      Sensation in the bilateral legs grossly intact to light touch. MOTOR:      Biceps  Triceps Deltoids Wrist Ext Wrist Flex Hand Intrin   Right 5/5 5/5 5/5 5/5 5/5 5/5   Left 5/5 5/5 5/5 5/5 5/5 5/5             Hip Flex  Quads Hamstrings Ankle DF EHL Ankle PF   Right 5/5 5/5 5/5 5/5 5/5 5/5   Left 5/5 5/5 5/5 5/5 5/5 5/5     DTRs are 2+ biceps, triceps, brachioradialis, patella, and Achilles.     Negative Straight Leg raise. Squat not tested. No difficulty with tandem gait.      Ambulation without assistive device. FWB.       RADIOGRAPHS  Thoracic MRI images taken on 02/17/2017 personally reviewed with patient:  Impression:   1. No acute compression deformity  2. Normal spinal cord signal.   3. No significant central canal or foraminal stenosis at any level.      Lumbar MRI images taken on 02/17/2017 personally reviewed with patient:  Impression:   No focal disc protrusion. No significant central canal or foraminal stenosis at any level.       reviewed     Mr. Julieta Mace has a reminder for a \"due or due soon\" health maintenance. I have asked that he contact his primary care provider for follow-up on this health maintenance.      This plan was reviewed with the patient and patient agrees. All questions were answered.  More than half of this visit today was spent on counseling.     Written by Erik Kline, as dictated by Dr. Jonh Treviño, Dr. Favio Rubalcava, confirm that all documentation is accurate.

## 2017-11-06 DIAGNOSIS — M25.551 RIGHT HIP PAIN: ICD-10-CM

## 2017-11-06 DIAGNOSIS — M25.552 LEFT HIP PAIN: ICD-10-CM

## 2017-11-09 ENCOUNTER — OFFICE VISIT (OUTPATIENT)
Dept: ORTHOPEDIC SURGERY | Age: 32
End: 2017-11-09

## 2017-11-09 VITALS
BODY MASS INDEX: 32.3 KG/M2 | HEIGHT: 66 IN | WEIGHT: 201 LBS | SYSTOLIC BLOOD PRESSURE: 116 MMHG | HEART RATE: 79 BPM | DIASTOLIC BLOOD PRESSURE: 89 MMHG

## 2017-11-09 DIAGNOSIS — M79.18 MYOFASCIAL PAIN: Primary | ICD-10-CM

## 2017-11-09 DIAGNOSIS — G57.01 PIRIFORMIS SYNDROME OF RIGHT SIDE: ICD-10-CM

## 2017-11-09 DIAGNOSIS — M54.50 LUMBAR SPINE PAIN: ICD-10-CM

## 2017-11-09 RX ORDER — BETAMETHASONE SODIUM PHOSPHATE AND BETAMETHASONE ACETATE 3; 3 MG/ML; MG/ML
12 INJECTION, SUSPENSION INTRA-ARTICULAR; INTRALESIONAL; INTRAMUSCULAR; SOFT TISSUE ONCE
Qty: 2 ML | Refills: 0
Start: 2017-11-09 | End: 2017-11-09

## 2017-11-09 RX ORDER — LIDOCAINE HYDROCHLORIDE 20 MG/ML
4 INJECTION, SOLUTION EPIDURAL; INFILTRATION; INTRACAUDAL; PERINEURAL ONCE
Qty: 4 ML | Refills: 0
Start: 2017-11-09 | End: 2017-11-09

## 2017-11-09 RX ORDER — BUPIVACAINE HYDROCHLORIDE 2.5 MG/ML
4 INJECTION, SOLUTION INFILTRATION; PERINEURAL ONCE
Qty: 4 ML | Refills: 0
Start: 2017-11-09 | End: 2017-11-09

## 2017-11-09 NOTE — PROGRESS NOTES
[address]  [address]  [place], [address] [place] [address] Street  Phone: [phone]  Fax: [phone]        [employer] Warner  : 1985  PCP: No primary care provider on file. 2017    PROGRESS NOTE      ASSESSMENT AND PLAN    Kassandra Caraballo comes in to the office today for a PT f/u. He has primarily been treated for lumbar myofascial pain related to a work injury. His symptoms have not changed with this course of treatment. He continues to have tenderness in the lumbar paraspinals. I performed a FADIR test with palpation to the piriformis which reproduced radicular symptoms into the RLE. I added evaluation of piriformis syndrome onto his PT regimen. I provided trigger point injections in the office today, which improved his pain from a 5/10 to a 1/10. Pt will f/u in 3 weeks or sooner as needed. Diagnoses and all orders for this visit:    1. Myofascial pain  -     bupivacaine (MARCAINE) 0.25 % (2.5 mg/mL) soln injection; 4 mL by SubCUTAneous route once for 1 dose. -     INJECTION, BUPIVICAINE HYDRO  -     LIDOCAINE INJECTION  -     lidocaine, PF, (XYLOCAINE) 20 mg/mL (2 %) injection; 4 mL by Other route once for 1 dose. -     betamethasone (CELESTONE SOLUSPAN) 6 mg/mL injection; 2 mL by IntraMUSCular route once for 1 dose.  -     BETAMETHASONE ACETATE & SODIUM PHOSPHATE INJECTION 6 MG  -     36519 - INJECT TRIGGER POINTS, > 3    2. Piriformis syndrome of right side  -     REFERRAL TO PHYSICAL THERAPY    3. Lumbar spine pain         Follow-up Disposition:  Return in about 3 weeks (around 2017), or if symptoms worsen or fail to improve. HISTORY OF PRESENT ILLNESS  Kassandra Caraballo is a 28 y.o. male. A&P / HPI from 2017:  Rushie Fallow in to the office today c/o low back pain.       His symptoms are likely myofascial in nature.  On the examination he had localized tenderness immediately superior to the PSIS.     I did dry needling in the office today which provided good twitch responses. I also did a trigger point injection that provided immediate relief. I referred him to PT with dry needling and a [facility] based program.      HISTORY OF PRESENT ILLNESS  Maranda Peters is a 28 y. o. male c/o pain in the lower lumbar region due to a fall at work in December 2016. He states that he was on a ladder approximately 5 feet in the air when a step broke and he backwards onto a ladder. He reports that his pain is worse with bending forward. He was treated by Dr. Florencio Saxena who informed him that his symptoms were due to hip dysplasia. He was also evaluated by Santhosh Ha who treated him to PT with approximaetly 16 sessions which did not improve his symptoms. He reports a tingling sensation along the anterior aspect of the right lower extremity. He recently had a thoracic and lumbar MRI which did not show significant evidence of radiculopathy or stenosis.     Pt denies any fevers, chills, nausea, vomiting. Pt denies any chest pain and shortness of breath. Pt denies any ear, nose, and throat problems. Pt denies any fecal or urinary incontinence.       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         Updates from 10/19/17:  Pt presents for a PT f/u. He just completed his PT eval. He reports the exercises provided immediate relief. He reports his symptoms have not changed since his last office visit. The dry needling increased his pain for approximately 3 days and then provided moderate relief. He describes his relief as \"numbness. \" He reports radicular symptoms along the posterior aspect of the right lower extremity that is worse when he is sleeping.        Updates from 11/09/17:  Pt presents for a PT f/u. His pain today is described as constant aching 5/10 with numbness and tingling into the posterior aspect of the RLE ending in the calf. He notes his pain is primarily located in the lumbar region. Through the session, he has been treated with a TENS unit and general strengthening. PAST MEDICAL HISTORY   Past Medical History:   Diagnosis Date    Asthma        History reviewed. No pertinent surgical history. Mely Yoder MEDICATIONS      Current Outpatient Prescriptions   Medication Sig Dispense Refill    bupivacaine (MARCAINE) 0.25 % (2.5 mg/mL) soln injection 4 mL by SubCUTAneous route once for 1 dose. 4 mL 0    lidocaine, PF, (XYLOCAINE) 20 mg/mL (2 %) injection 4 mL by Other route once for 1 dose. 4 mL 0    betamethasone (CELESTONE SOLUSPAN) 6 mg/mL injection 2 mL by IntraMUSCular route once for 1 dose. 2 mL 0    gabapentin (NEURONTIN) 300 mg capsule Take 1 Cap by mouth three (3) times daily. 90 Cap 2    naproxen sodium (ALEVE) 220 mg cap Take  by mouth.  naproxen (NAPROSYN) 250 mg tablet Take  by mouth two (2) times daily (with meals).  HYDROcodone-acetaminophen (VICODIN) 5-500 mg per tablet Take  by mouth.  valACYclovir (VALTREX) 1 g tablet Take  by mouth.  fexofenadine (ALLEGRA) 30 mg tablet Take 30 mg by mouth two (2) times a day.  clindamycin (CLEOCIN T) 1 % external solution Apply  to affected area two (2) times a day. use thin film on affected area       fluticasone (FLONASE) 50 mcg/actuation nasal spray nightly. ALLERGIES  No Known Allergies       SOCIAL HISTORY    Social History     Social History    Marital status: SINGLE     Spouse name: N/A    Number of children: N/A    Years of education: N/A     Social History Main Topics    Smoking status: Never Smoker    Smokeless tobacco: Never Used    Alcohol use No    Drug use: None    Sexual activity: Not Asked     Other Topics Concern    None     Social History Narrative       FAMILY HISTORY  History reviewed. No pertinent family history. REVIEW OF SYSTEMS  Review of Systems   Constitutional: Negative for chills, diaphoresis, fever, malaise/fatigue and weight loss.    Respiratory: Negative for shortness of breath. Cardiovascular: Negative for chest pain and leg swelling. Gastrointestinal: Negative for constipation, nausea and vomiting. Neurological: Negative for dizziness, tingling, seizures, loss of consciousness, weakness and headaches. Psychiatric/Behavioral: The patient does not have insomnia. PHYSICAL EXAMINATION  Visit Vitals    /89    Pulse 79    Ht 5' 6\" (1.676 m)    Wt 201 lb (91.2 kg)    BMI 32.44 kg/m2       Pain Assessment  11/9/2017   Location of Pain Back;Leg   Location Modifiers Right   Severity of Pain 5   Quality of Pain Aching   Quality of Pain Comment numbness   Duration of Pain Persistent   Frequency of Pain Constant   Aggravating Factors -   Limiting Behavior Some   Relieving Factors Nothing   Result of Injury Yes   Work-Related Injury Yes   Type of Injury -           Constitutional:  Well developed, well nourished, in no acute distress. Psychiatric: Affect and mood are appropriate. Integumentary: No rashes or abrasions noted on exposed areas. SPINE/MUSCULOSKELETAL EXAM    Cervical spine:  Neck is midline. Normal muscle tone. No focal atrophy is noted. ROM pain free. Shoulder ROM intact. No tenderness to palpation. Negative Spurling's sign. Negative Tinel's sign. Negative Campbell's sign.       Sensation in the bilateral arms grossly intact to light touch.       Lumbar spine:  No rash, ecchymosis, or gross obliquity. No fasciculations. No focal atrophy is noted. No pain with hip ROM. Full range of motion. Tenderness to palpation  No tenderness to palpation at the sciatic notch. SI joints non-tender. Negative DAVID test   Positive right P4 test   Negative Compression test  Positive Distraction test  Trochanters non tender.      Sensation in the bilateral legs grossly intact to light touch.     MOTOR:      Biceps  Triceps Deltoids Wrist Ext Wrist Flex Hand Intrin   Right 5/5 5/5 5/5 5/5 5/5 5/5   Left 5/5 5/5 5/5 5/5 5/5 5/5             Hip Flex  Quads Hamstrings Ankle DF EHL Ankle PF   Right 5/5 5/5 5/5 5/5 5/5 5/5   Left 5/5 5/5 5/5 5/5 5/5 5/5     DTRs are 2+ biceps, triceps, brachioradialis, patella, and Achilles.      Negative Straight Leg raise. Squat not tested. No difficulty with tandem gait.       Ambulation without assistive device. FWB.       RADIOGRAPHS  Thoracic MRI images taken on 02/17/2017 personally reviewed with patient:  Impression:   1. No acute compression deformity  2. Normal spinal cord signal.   3. No significant central canal or foraminal stenosis at any level. Lumbar MRI images taken on 02/17/2017 personally reviewed with patient:  Impression:   No focal disc protrusion. No significant central canal or foraminal stenosis at any level. [facility]  OFFICE PROCEDURE PROGRESS NOTE      PROCEDURE: In the office today after informed consent using aseptic technique, the patient was injected with a total of 2 cc of Celestone, 0.5 cc each of Lidocaine and Marcaine into his lumbar paraspinal muscles. Chart reviewed for the following:   I, Dr. Nanda Jin, have reviewed the History, Physical and updated the Allergic reactions for [employer] performed immediately prior to start of procedure:   IDr. Nanda, have performed the following reviews on [facility] prior to the start of the procedure:            * Patient was identified by name and date of birth   * Agreement on procedure being performed was verified  * Risks and Benefits explained to the patient  * Procedure site verified and marked as necessary  * Patient was positioned for comfort  * Consent was signed and verified    Date of procedure: 11/9/2017    Procedure performed by:  Ubaldo Dillon MD    Provider assisted by: None    Patient assisted by: Self    How tolerated by patient: Pt tolerated the procedure well with no complications.      reviewed      Mr. Peters has a reminder for a \"due or due soon\" health maintenance. I have asked that he contact his primary care provider for follow-up on this health maintenance.       This plan was reviewed with the patient and patient agrees. All questions were answered.  More than half of this visit today was spent on counseling.      Written by Ron Roberson, as dictated by Dr. Jenna Kincaid, Dr. Sultana Thompson, confirm that all documentation is accurate.

## 2017-11-09 NOTE — PATIENT INSTRUCTIONS
Piriformis Syndrome: Exercises  Your Care Instructions  Here are some examples of typical rehabilitation exercises for your condition. Start each exercise slowly. Ease off the exercise if you start to have pain. Your doctor or physical therapist will tell you when you can start these exercises and which ones will work best for you. How to do the exercises  Hip rotator stretch    1. Lie on your back with both knees bent and your feet flat on the floor. 2. Put the ankle of your affected leg on your opposite thigh near your knee. 3. Use your hand to gently push your knee (on your affected leg) away from your body until you feel a gentle stretch around your hip. 4. Hold the stretch for 15 to 30 seconds. 5. Repeat 2 to 4 times. 6. Switch legs and repeat steps 1 through 5. Piriformis stretch    1. Lie on your back with your legs straight. 2. Lift your affected leg and bend your knee. With your opposite hand, reach across your body, and then gently pull your knee toward your opposite shoulder. 3. Hold the stretch for 15 to 30 seconds. 4. Repeat with your other leg. 5. Repeat 2 to 4 times on each side. Lower abdominal strengthening    1. Lie on your back with your knees bent and your feet flat on the floor. 2. Tighten your belly muscles by pulling your belly button in toward your spine. 3. Lift one foot off the floor and bring your knee toward your chest, so that your knee is straight above your hip and your leg is bent like the letter \"L. \"  4. Lift the other knee up to the same position. 5. Lower one leg at a time to the starting position. 6. Keep alternating legs until you have lifted each leg 8 to 12 times. 7. Be sure to keep your belly muscles tight and your back still as you are moving your legs. Be sure to breathe normally. Follow-up care is a key part of your treatment and safety. Be sure to make and go to all appointments, and call your doctor if you are having problems.  It's also a good idea to know your test results and keep a list of the medicines you take. Where can you learn more? Go to http://susanne-radhika.info/. Enter I668 in the search box to learn more about \"Piriformis Syndrome: Exercises. \"  Current as of: March 21, 2017  Content Version: 11.4  © 3488-8273 [employer]. Care instructions adapted under license by [employer] (which disclaims liability or warranty for this information). If you have questions about a medical condition or this instruction, always ask your healthcare professional. [address] any warranty or liability for your use of this information.

## 2017-11-09 NOTE — MR AVS SNAPSHOT
Visit Information Date & Time Provider Department Dept. Phone Encounter #  
 11/9/2017  9:45 AM Lauren Mcnair MD [facility] [phone] [id number] Follow-up Instructions Return in about 3 weeks (around 11/30/2017), or if symptoms worsen or fail to improve. Your Appointments 11/17/2017  8:15 AM  
DIAG TEST F/U with Edgar Lamas MD  
[address] [facility] [address], [address] [facility]  ([facility]) Appt Note: JN KNEE MRI TEST RESULTS. [address] [facility], [address] [facility]  
[phone] [facility] Upcoming Health Maintenance Date Due Pneumococcal 19-64 Medium Risk (1 of 1 - PPSV23) 3/2/2004 DTaP/Tdap/Td series (1 - Tdap) 3/2/2006 Influenza Age 5 to Adult 8/1/2017 Allergies as of 11/9/2017  Review Complete On: 11/9/2017 By: Rupal Sousa No Known Allergies Current Immunizations  Never Reviewed No immunizations on file. Not reviewed this visit You Were Diagnosed With   
  
 Codes Comments Myofascial pain    -  Primary ICD-10-CM: M79.1 ICD-9-CM: 729.1 Piriformis syndrome of right side     ICD-10-CM: G57.01 
ICD-9-CM: 355.0 Lumbar spine pain     ICD-10-CM: M54.5 ICD-9-CM: 724.2 Vitals BP Pulse Height(growth percentile) Weight(growth percentile) BMI Smoking Status 116/89 79 5' 6\" (1.676 m) 201 lb (91.2 kg) 32.44 kg/m2 Never Smoker Vitals History BMI and BSA Data Body Mass Index Body Surface Area  
 32.44 kg/m 2 2.06 m 2 Preferred Pharmacy Pharmacy Name Phone [address], [address] [place] [address] [phone] Your Updated Medication List  
  
   
This list is accurate as of: 11/9/17 10:54 AM.  Always use your most recent med list.  
  
  
  
  
 ALEVE 220 mg Cap Generic drug:  naproxen sodium Take  by mouth. betamethasone 6 mg/mL injection Commonly known as:  CELESTONE SOLUSPAN  
2 mL by IntraMUSCular route once for 1 dose. bupivacaine 0.25 % (2.5 mg/mL) Soln injection Commonly known as:  MARCAINE  
4 mL by SubCUTAneous route once for 1 dose. clindamycin 1 % external solution Commonly known as:  CLEOCIN T Apply  to affected area two (2) times a day. use thin film on affected area  
  
 fexofenadine 30 mg tablet Commonly known as:  Ernesto Eduar Take 30 mg by mouth two (2) times a day. FLONASE 50 mcg/actuation nasal spray Generic drug:  fluticasone  
nightly.  
  
 gabapentin 300 mg capsule Commonly known as:  NEURONTIN Take 1 Cap by mouth three (3) times daily. lidocaine (PF) 20 mg/mL (2 %) injection Commonly known as:  XYLOCAINE  
4 mL by Other route once for 1 dose. NAPROSYN 250 mg tablet Generic drug:  naproxen Take  by mouth two (2) times daily (with meals). VALTREX 1 gram tablet Generic drug:  valACYclovir Take  by mouth. VICODIN 5-500 mg per tablet Generic drug:  HYDROcodone-acetaminophen Take  by mouth. We Performed the Following BETAMETHASONE ACETATE & SODIUM PHOSPHATE INJECTION 3 MG EA. [ [facility]] INJECTION, BUPIVICAINE HYDRO [ [facility]] LIDOCAINE INJECTION [ [facility]] OH INJECT TRIGGER POINTS, > 3 [id number] CPT(R)] REFERRAL TO PHYSICAL THERAPY [MZK68 Custom] Comments:  
 PIVOT PT.  
eval and treat; possible piriformis syndrome Follow-up Instructions Return in about 3 weeks (around 11/30/2017), or if symptoms worsen or fail to improve. Referral Information Referral ID Referred By Referred To  
  
 [id number] Verneda Snellen, DAVID Not Available Visits Status Start Date End Date 1 New Request 11/9/17 11/9/18 If your referral has a status of pending review or denied, additional information will be sent to support the outcome of this decision. Patient Instructions Piriformis Syndrome: Exercises Your Care Instructions Here are some examples of typical rehabilitation exercises for your condition. Start each exercise slowly. Ease off the exercise if you start to have pain. Your doctor or physical therapist will tell you when you can start these exercises and which ones will work best for you. How to do the exercises Hip rotator stretch 1. Lie on your back with both knees bent and your feet flat on the floor. 2. Put the ankle of your affected leg on your opposite thigh near your knee. 3. Use your hand to gently push your knee (on your affected leg) away from your body until you feel a gentle stretch around your hip. 4. Hold the stretch for 15 to 30 seconds. 5. Repeat 2 to 4 times. 6. Switch legs and repeat steps 1 through 5. Piriformis stretch 1. Lie on your back with your legs straight. 2. Lift your affected leg and bend your knee. With your opposite hand, reach across your body, and then gently pull your knee toward your opposite shoulder. 3. Hold the stretch for 15 to 30 seconds. 4. Repeat with your other leg. 5. Repeat 2 to 4 times on each side. Lower abdominal strengthening 1. Lie on your back with your knees bent and your feet flat on the floor. 2. Tighten your belly muscles by pulling your belly button in toward your spine. 3. Lift one foot off the floor and bring your knee toward your chest, so that your knee is straight above your hip and your leg is bent like the letter \"L. \" 
4. Lift the other knee up to the same position. 5. Lower one leg at a time to the starting position. 6. Keep alternating legs until you have lifted each leg 8 to 12 times. 7. Be sure to keep your belly muscles tight and your back still as you are moving your legs. Be sure to breathe normally. Follow-up care is a key part of your treatment and safety.  Be sure to make and go to all appointments, and call your doctor if you are having problems. It's also a good idea to know your test results and keep a list of the medicines you take. Where can you learn more? Go to http://susanne-radhika.info/. Enter P760 in the search box to learn more about \"Piriformis Syndrome: Exercises. \" Current as of: March 21, 2017 Content Version: 11.4 © 7211-5679 [employer]. Care instructions adapted under license by [employer] (which disclaims liability or warranty for this information). If you have questions about a medical condition or this instruction, always ask your healthcare professional. [place] [address] any warranty or liability for your use of this information. Introducing \[facility] [place] [facility]\"" & HEALTH SERVICES! [address] introduces [employer]to[employer]o patient portal. Now you can access parts of your medical record, email your doctor's office, and request medication refills online. 1. In your internet browser, go to [email or web address] 2. Click on the First Time User? Click Here link in the Sign In box. You will see the New Member Sign Up page. 3. Enter your ProtoGeo Access Code exactly as it appears below. You will not need to use this code after youve completed the sign-up process. If you do not sign up before the expiration date, you must request a new code. · ProtoGeo Access Code: [email or web address] Expires: 2/7/2018 10:27 AM 
 
4. Enter the last four digits of your Social Security Number (xxxx) and Date of Birth (mm/dd/yyyy) as indicated and click Submit. You will be taken to the next sign-up page. 5. Create a [employer] ID. This will be your ProtoGeo login ID and cannot be changed, so think of one that is secure and easy to remember. 6. Create a ProtoGeo password. You can change your password at any time. 7. Enter your Password Reset Question and Answer. This can be used at a later time if you forget your password. 8. Enter your e-mail address.  You will receive e-mail notification when new information is available in [employer]. 9. Click Sign Up. You can now view and download portions of your medical record. 10. Click the Download Summary menu link to download a portable copy of your medical information. If you have questions, please visit the Frequently Asked Questions section of the [employer] website. Remember, Easy Metrics is NOT to be used for urgent needs. For medical emergencies, dial 911. Now available from your iPhone and Android! Please provide this summary of care documentation to your next provider. If you have any questions after today's visit, please call [phone].

## 2017-11-17 ENCOUNTER — OFFICE VISIT (OUTPATIENT)
Dept: ORTHOPEDIC SURGERY | Age: 32
End: 2017-11-17

## 2017-11-17 VITALS
WEIGHT: 203.2 LBS | HEART RATE: 74 BPM | BODY MASS INDEX: 32.66 KG/M2 | SYSTOLIC BLOOD PRESSURE: 130 MMHG | DIASTOLIC BLOOD PRESSURE: 96 MMHG | HEIGHT: 66 IN | OXYGEN SATURATION: 98 % | TEMPERATURE: 98.1 F

## 2017-11-17 DIAGNOSIS — S73.191A TEAR OF RIGHT ACETABULAR LABRUM, INITIAL ENCOUNTER: Primary | ICD-10-CM

## 2017-11-17 DIAGNOSIS — M25.551 RIGHT HIP PAIN: ICD-10-CM

## 2017-11-17 NOTE — LETTER
11/17/2017 9:03 AM 
 
Mr. Daniel Marte [address] [address] To Whom It May Concern: 
 
Daniel Marte is currently under the care of [address] [place] [address] Kevin Escoto. He was seen in our office Friday, 11/17/17. If there are questions or concerns please have the patient contact our office. Sincerely, Griselda Handing, MD

## 2017-11-17 NOTE — PROGRESS NOTES
Patient: Tanner Archuleta                MRN: [id number]       SSN: [id number]  YOB: 1985        AGE: 28 y.o. SEX: male  Body mass index is 32.8 kg/(m^2). PCP: No primary care provider on file. 11/17/17    HISTORY: Mr. Pascual Hinojosa returns in followup. He is having no left hip pain whatsoever. It is the right hip. It hurts him at night to roll over on it. He does get persistent groin pain. He works as an , and it hurts him when he is at work as well. He has no true catching or locking. His previous x-rays were reviewed. He does have some mild dysplasia, and on the left hip, very significant CAM impingement worse on the left than on the right. Interestingly enough, he is asymptomatic on the left side. He has had some injections in the back recently, which have been quite helpful from Dr. Sydney Vieira. PHYSICAL EXAMINATION:  On examination today, he walks normally. There is no antalgic component to the gait. He has a little bit of tenderness over the greater trochanter on the right side. The low back is mildly tender. Sensation is grossly intact. The hip with flexion, adduction, and internal rotation is mildly tender in the groin area, but there is no catching or locking. 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 The right hip has mild dysplasia, still with persistent groin pain. The MRI was negative for chondrolysis or AVN or fracture with some mild mechanical symptoms. PLAN:  I would recommend an MRI arthrogram of the right hip. Surprisingly, he is not too interested in having a cortisone injection for his hip bursitis. We will see him back after the MRI arthrogram to look for labral tear on the right side.          REVIEW OF SYSTEMS:      CON: negative for weight loss, fever  EYE: negative for double vision  ENT: negative for hoarseness  RS:   negative for Tb  GI:    negative for blood in stool  :  negative for blood in urine  Other systems reviewed and noted below. Past Medical History:   Diagnosis Date    Asthma        History reviewed. No pertinent family history. Current Outpatient Prescriptions   Medication Sig Dispense Refill    gabapentin (NEURONTIN) 300 mg capsule Take 1 Cap by mouth three (3) times daily. 90 Cap 2    naproxen sodium (ALEVE) 220 mg cap Take  by mouth.  naproxen (NAPROSYN) 250 mg tablet Take  by mouth two (2) times daily (with meals).  HYDROcodone-acetaminophen (VICODIN) 5-500 mg per tablet Take  by mouth.  valACYclovir (VALTREX) 1 g tablet Take  by mouth.  fexofenadine (ALLEGRA) 30 mg tablet Take 30 mg by mouth two (2) times a day.  clindamycin (CLEOCIN T) 1 % external solution Apply  to affected area two (2) times a day. use thin film on affected area       fluticasone (FLONASE) 50 mcg/actuation nasal spray nightly. No Known Allergies    History reviewed. No pertinent surgical history. Social History     Social History    Marital status: SINGLE     Spouse name: N/A    Number of children: N/A    Years of education: N/A     Occupational History    Not on file. Social History Main Topics    Smoking status: Never Smoker    Smokeless tobacco: Never Used    Alcohol use No    Drug use: Not on file    Sexual activity: Not on file     Other Topics Concern    Not on file     Social History Narrative       Visit Vitals    BP (!) 130/96    Pulse 74    Temp 98.1 °F (36.7 °C)    Ht 5' 6\" (1.676 m)    Wt 203 lb 3.2 oz (92.2 kg)    SpO2 98%    BMI 32.8 kg/m2         PHYSICAL EXAMINATION:  GENERAL: Alert and oriented x3, in no acute distress, well-developed, well-nourished, afebrile. HEART: No JVD. EYES: No scleral icterus   NECK: No significant lymphadenopathy   LUNGS: No respiratory compromise or indrawing  ABDOMEN: Soft, non-tender, non-distended. Electronically signed by:  Christelle Luis Diaz Kraus MD

## 2017-11-28 ENCOUNTER — OFFICE VISIT (OUTPATIENT)
Dept: ORTHOPEDIC SURGERY | Age: 32
End: 2017-11-28

## 2017-11-28 VITALS
SYSTOLIC BLOOD PRESSURE: 121 MMHG | WEIGHT: 203.2 LBS | BODY MASS INDEX: 32.66 KG/M2 | HEART RATE: 87 BPM | OXYGEN SATURATION: 97 % | TEMPERATURE: 98.2 F | DIASTOLIC BLOOD PRESSURE: 80 MMHG | RESPIRATION RATE: 18 BRPM | HEIGHT: 66 IN

## 2017-11-28 DIAGNOSIS — M46.1 SACROILIITIS (HCC): Primary | ICD-10-CM

## 2017-11-28 NOTE — LETTER
NOTIFICATION RETURN TO WORK  
 
11/28/2017 3:56 PM 
 
Mr. Radha Mckeon [address] [address] To Whom It May Concern: 
 
Radha Mckeon is currently under the care of [facility]. He was evaluated in our office on 11/28/2017. If there are questions or concerns please have the patient contact our office.  
 
 
 
Sincerely, 
 
 
Pearl Woods MD

## 2017-11-28 NOTE — PROGRESS NOTES
[address]  [address], [address]  [place], [address] [place] [address] Street  Phone: [phone]  Fax: [phone]        Millie Dear  : 1985  PCP: No primary care provider on file. 2017    PROGRESS NOTE      ASSESSMENT AND PLAN    Edgard Schultz comes in to the office today for a PT f/u. The sessions have slightly improved his symptoms. His pain today is likely related to sacroiliitis. On the examination, he had a positive right DAVID, P4, distraction, and Gaenslen's test.     I referred him to get a right SI joint injection. He will continue with the current plan of care at PT along with adding SI joint exercises onto the regimen. Pt will f/u in 3 weeks or sooner as needed. Diagnoses and all orders for this visit:    1. Sacroiliitis ([facility] [address]  -     SCHEDULE SURGERY       Follow-up Disposition:  Return in about 3 weeks (around 2017), or if symptoms worsen or fail to improve. HISTORY OF PRESENT ILLNESS  Edgard Schultz is a 28 y.o. male. A&P / HPI from 2017:  Margaux Bloom in to the office today c/o low back pain.       His symptoms are likely myofascial in nature. On the examination he had localized tenderness immediately superior to the PSIS.     I did dry needling in the office today which provided good twitch responses. I also did a trigger point injection that provided immediate relief. I referred him to PT with dry needling and a [facility] based program.       HISTORY OF PRESENT ILLNESS  Roverto Peters is a 28 y. o. male c/o pain in the lower lumbar region due to a fall at work in 2016. He states that he was on a ladder approximately 5 feet in the air when a step broke and he backwards onto a ladder. He reports that his pain is worse with bending forward. He was treated by Dr. Ozzy Robertson who informed him that his symptoms were due to hip dysplasia.  [facility] him to PT with approximaetly 16 sessions which did not improve his symptoms. He reports a tingling sensation along the anterior aspect of the right lower extremity. He recently had a thoracic and lumbar MRI which did not show significant evidence of radiculopathy or stenosis.     Pt denies any fevers, chills, nausea, vomiting. Pt denies any chest pain and shortness of breath. Pt denies any ear, nose, and throat problems. Pt denies any fecal or urinary incontinence.       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         Updates from 10/19/17:  Pt presents for a PT f/u. He just completed his PT eval. He reports the exercises provided immediate relief. He reports his symptoms have not changed since his last office visit. The dry needling increased his pain for approximately 3 days and then provided moderate relief. He describes his relief as \"numbness. \" He reports radicular symptoms along the posterior aspect of the right lower extremity that is worse when he is sleeping.         Updates from 11/09/17:  Pt presents for a PT f/u. His pain today is described as constant aching 5/10 with numbness and tingling into the posterior aspect of the RLE ending in the calf. He notes his pain is primarily located in the lumbar region. Through the session, he has been treated with a TENS unit and general strengthening. Updates from 11/28/17:  Pt presents for a PT f/u. The sessions have continued to improve his pain. However, his symptoms are reproducible when lying on the right side. PAST MEDICAL HISTORY   Past Medical History:   Diagnosis Date    Asthma        History reviewed. No pertinent surgical history. [facility] MEDICATIONS      Current Outpatient Prescriptions   Medication Sig Dispense Refill    gabapentin (NEURONTIN) 300 mg capsule Take 1 Cap by mouth three (3) times daily. 90 Cap 2    naproxen sodium (ALEVE) 220 mg cap Take  by mouth.       naproxen (NAPROSYN) 250 mg tablet Take  by mouth two (2) times daily (with meals).  HYDROcodone-acetaminophen (VICODIN) 5-500 mg per tablet Take  by mouth.  valACYclovir (VALTREX) 1 g tablet Take  by mouth.  fexofenadine (ALLEGRA) 30 mg tablet Take 30 mg by mouth two (2) times a day.  clindamycin (CLEOCIN T) 1 % external solution Apply  to affected area two (2) times a day. use thin film on affected area       fluticasone (FLONASE) 50 mcg/actuation nasal spray nightly. ALLERGIES  No Known Allergies       SOCIAL HISTORY    Social History     Social History    Marital status: SINGLE     Spouse name: N/A    Number of children: N/A    Years of education: N/A     Social History Main Topics    Smoking status: Never Smoker    Smokeless tobacco: Never Used    Alcohol use No    Drug use: None    Sexual activity: Not Asked     Other Topics Concern    None     Social History Narrative       FAMILY HISTORY  History reviewed. No pertinent family history. REVIEW OF SYSTEMS  Review of Systems   Constitutional: Negative for chills, diaphoresis, fever, malaise/fatigue and weight loss. Respiratory: Negative for shortness of breath. Cardiovascular: Negative for chest pain and leg swelling. Gastrointestinal: Negative for constipation, nausea and vomiting. Neurological: Negative for dizziness, tingling, seizures, loss of consciousness, weakness and headaches. Psychiatric/Behavioral: The patient does not have insomnia.            PHYSICAL EXAMINATION  Visit Vitals    /80    Pulse 87    Temp 98.2 °F (36.8 °C) (Oral)    Resp 18    Ht 5' 6\" (1.676 m)    Wt 203 lb 3.2 oz (92.2 kg)    SpO2 97%    BMI 32.8 kg/m2       Pain Assessment  11/28/2017   Location of Pain Back;Leg   Location Modifiers Right   Severity of Pain 4   Quality of Pain Aching   Quality of Pain Comment numbness tingling   Duration of Pain Persistent   Frequency of Pain Constant   Aggravating Factors -   Limiting Behavior Some   Relieving Factors Nothing   Result of Injury Yes   Work-Related Injury Yes   Type of Injury -           Constitutional:  Well developed, well nourished, in no acute distress. Psychiatric: Affect and mood are appropriate. Integumentary: No rashes or abrasions noted on exposed areas. SPINE/MUSCULOSKELETAL EXAM    Cervical spine:  Neck is midline. Normal muscle tone. No focal atrophy is noted. ROM pain free. Shoulder ROM intact. No tenderness to palpation. Negative Spurling's sign. Negative Tinel's sign. Negative Campbell's sign.       Sensation in the bilateral arms grossly intact to light touch.       Lumbar spine:  No rash, ecchymosis, or gross obliquity. No fasciculations. No focal atrophy is noted. No pain with hip ROM. Full range of motion. Tenderness to palpation  No tenderness to palpation at the sciatic notch. SI joints non-tender. Negative DAVID test   Positive right P4 test   Negative Compression test  Positive Distraction test  Trochanters non tender.      Sensation in the bilateral legs grossly intact to light touch. Updates from 11/28/17:  Positive right DAVID   Positive right P4   Positive distraction   Negative compression   Positive right Gaenslen's test       MOTOR:      Biceps  Triceps Deltoids Wrist Ext Wrist Flex Hand Intrin   Right 5/5 5/5 5/5 5/5 5/5 5/5   Left 5/5 5/5 5/5 5/5 5/5 5/5             Hip Flex  Quads Hamstrings Ankle DF EHL Ankle PF   Right 5/5 5/5 5/5 5/5 5/5 5/5   Left 5/5 5/5 5/5 5/5 5/5 5/5     DTRs are 2+ biceps, triceps, brachioradialis, patella, and Achilles.      Negative Straight Leg raise. Squat not tested. No difficulty with tandem gait.       Ambulation without assistive device. FWB.       RADIOGRAPHS  Thoracic MRI images taken on 02/17/2017 personally reviewed with patient:  Impression:   1. No acute compression deformity  2. Normal spinal cord signal.   3. No significant central canal or foraminal stenosis at any level.      Lumbar MRI images taken on 02/17/2017 personally reviewed with patient:  Impression:   No focal disc protrusion. No significant central canal or foraminal stenosis at any level.      [facility] MAST ONE  OFFICE PROCEDURE PROGRESS NOTE        PROCEDURE: In the office today after informed consent using aseptic technique, the patient was injected with a total of 2 cc of Celestone, 0.5 cc each of Lidocaine and Marcaine into his lumbar paraspinal muscles.      Chart reviewed for the following:                        I, Dr. Luis Alvarado, have reviewed the History, Physical and updated the Allergic reactions for [address] performed immediately prior to start of procedure:                        I, Dr. Luis Alvarado, have performed the following reviews on [facility] prior to the start of the procedure:      * Patient was identified by name and date of birth   * Agreement on procedure being performed was verified  * Risks and Benefits explained to the patient  * Procedure site verified and marked as necessary  * Patient was positioned for comfort  * Consent was signed and verified     Date of procedure: 11/9/2017     Procedure performed by:  Wendy Teague MD     Provider assisted by: None     Patient assisted by: Self     How tolerated by patient: Pt tolerated the procedure well with no complications.              reviewed      Mr. Peters has a reminder for a \"due or due soon\" health maintenance. I have asked that he contact his primary care provider for follow-up on this health maintenance.       This plan was reviewed with the patient and patient agrees. All questions were answered. More than half of this visit today was spent on counseling.      Written by Mike García, as dictated by Dr. Reginald Sanabria, Dr. Luis Alvarado, confirm that all documentation is accurate.

## 2017-12-14 ENCOUNTER — HOSPITAL ENCOUNTER (OUTPATIENT)
Age: 32
Setting detail: OUTPATIENT SURGERY
Discharge: HOME | End: 2017-12-14
Attending: PHYSICAL MEDICINE & REHABILITATION | Admitting: PHYSICAL MEDICINE & REHABILITATION
Payer: OTHER MISCELLANEOUS

## 2017-12-14 ENCOUNTER — APPOINTMENT (OUTPATIENT)
Dept: GENERAL RADIOLOGY | Age: 32
End: 2017-12-14
Attending: PHYSICAL MEDICINE & REHABILITATION
Payer: OTHER MISCELLANEOUS

## 2017-12-14 VITALS
TEMPERATURE: 98.1 F | HEIGHT: 65 IN | SYSTOLIC BLOOD PRESSURE: 145 MMHG | BODY MASS INDEX: 32.65 KG/M2 | HEART RATE: 65 BPM | DIASTOLIC BLOOD PRESSURE: 82 MMHG | OXYGEN SATURATION: 98 % | WEIGHT: 196 LBS | RESPIRATION RATE: 16 BRPM

## 2017-12-14 PROCEDURE — 74011250636 HC RX REV CODE- 250/636: Performed by: PHYSICAL MEDICINE & REHABILITATION

## 2017-12-14 PROCEDURE — 77030003676 HC NDL SPN MPRI -A: Performed by: PHYSICAL MEDICINE & REHABILITATION

## 2017-12-14 PROCEDURE — 74011636320 HC RX REV CODE- 636/320

## 2017-12-14 PROCEDURE — 74011250636 HC RX REV CODE- 250/636

## 2017-12-14 PROCEDURE — 74011000250 HC RX REV CODE- 250

## 2017-12-14 PROCEDURE — 74011250637 HC RX REV CODE- 250/637: Performed by: PHYSICAL MEDICINE & REHABILITATION

## 2017-12-14 PROCEDURE — 76010000009 HC PAIN MGT 0 TO 30 MIN PROC: Performed by: PHYSICAL MEDICINE & REHABILITATION

## 2017-12-14 RX ORDER — LIDOCAINE HYDROCHLORIDE 10 MG/ML
INJECTION, SOLUTION EPIDURAL; INFILTRATION; INTRACAUDAL; PERINEURAL AS NEEDED
Status: DISCONTINUED | OUTPATIENT
Start: 2017-12-14 | End: 2017-12-14 | Stop reason: HOSPADM

## 2017-12-14 RX ORDER — DEXAMETHASONE SODIUM PHOSPHATE 100 MG/10ML
INJECTION INTRAMUSCULAR; INTRAVENOUS AS NEEDED
Status: DISCONTINUED | OUTPATIENT
Start: 2017-12-14 | End: 2017-12-14 | Stop reason: HOSPADM

## 2017-12-14 RX ORDER — DIAZEPAM 5 MG/1
5-20 TABLET ORAL ONCE
Status: COMPLETED | OUTPATIENT
Start: 2017-12-14 | End: 2017-12-14

## 2017-12-14 RX ADMIN — DIAZEPAM 10 MG: 5 TABLET ORAL at 12:15

## 2017-12-14 NOTE — PROCEDURES
Procedure Note    Patient Name: Sanjuana Jacobsen    Date of Procedure: December 14, 2017    Preoperative Diagnosis: Sacrilitis    Post Operative Diagnosis: same    Procedure: SI Joint Injection right     Consent: Informed consent was obtained prior to the procedure. The patient was given the opportunity to ask questions regarding the procedure and its associated risks. In addition to the potential risks associated with the procedure itself, the patient was informed both verbally and in writing of potential side effects of the use of glucocorticoids. The patient appeared to comprehend the informed consent and desired to have the procedure perfored. Procedure: The patient was placed in the prone position on the flouroscopy table and the back was prepped and draped in the usual sterile manner. A #22 gauge spinal needle was then advanced to lie within the SI joint after local Lidocaine 1% injection. A total of 30 mg of preservative free Dexamethasone and 4 cc of Lidocaine was introduced into the SI joint. The injection area was cleaned and bandaids applied. No excessive bleeding was noted. Patient dressed and was discharged to home with instructions. Discussion: The patient tolerated the procedure well.      Alannah Lyon MD  December 14, 2017

## 2017-12-14 NOTE — DISCHARGE INSTRUCTIONS
[facility]   (JORGE LUIS)  Dr. Jacinta Stevens, Dr. Feliciana Spatz, Dr. Beck Cabral not drive a car, operate heavy machinery or dangerous equipment for 24 hours. * Activity as tolerated; rest for the remainder of the day. * Resume pre-block medications including those for your family doctor. * Do not drink alcoholic beverages for 24 hours. Alcohol and the medications you have received may interact and cause an adverse reaction. * You may feel better this evening and worse tomorrow, as the numbing medications wears off and the steroid has yet to begin to work. After 48 hrs the steroid should begin to release bringing you relief. * You may shower this evening and remove any bandages. * Avoid hot tubs and heating pads for 24 hours. You may use cold packs on the procedure site as tolerated for the first 24 hours. * If a headache develops, drink plenty of fluids and rest.  Take over the counter medications for headache if needed. If the headache continues longer than 24 hours, call MD at the [address] [place] [address]. [phone]    * Continue taking pain medications as needed. * You may resume your regular diet if tolerated. Otherwise, start with sips of water and advance slowly. * If Diabetic: check your blood sugar three times a day for the next 3 days. If your sugar is greater than 300 call your family doctor. If your sugar is greater than 400, have someone transport you to the nearest Emergency Room. * If you experience any of the following problems, Please Call the [address] [place] [address] at [phone].         * Shortness of Breath    * Fever of 101 or higher    * Nausea / Vomiting    * Severe Headache    * Weakness or numbness in arms or legs that is not      resolving    * Prolonged increase in pain greater than 4 days      DISCHARGE SUMMARY from Nurse      PATIENT INSTRUCTIONS:    After oral sedation, for 24 hours or while taking prescription Narcotics:  · Limit your activities  · Do not drive and operate hazardous machinery  · Do not make important personal or business decisions  · Do  not drink alcoholic beverages  · If you have not urinated within 8 hours after discharge, please contact your surgeon on call. Report the following to your surgeon:  · Excessive pain, swelling, redness or odor of or around the surgical area  · Temperature over 101  · Nausea and vomiting lasting longer than 4 hours or if unable to take medications  · Any signs of decreased circulation or nerve impairment to extremity: change in color, persistent  numbness, tingling, coldness or increase pain  · Any questions            What to do at Home:  Recommended activity: Activity as tolerated, NO DRIVING FOR 12 Hours post injection          *  Please give a list of your current medications to your Primary Care Provider. *  Please update this list whenever your medications are discontinued, doses are      changed, or new medications (including over-the-counter products) are added. *  Please carry medication information at all times in case of emergency situations. These are general instructions for a healthy lifestyle:    No smoking/ No tobacco products/ Avoid exposure to second hand smoke    Surgeon General's Warning:  Quitting smoking now greatly reduces serious risk to your health. Obesity, smoking, and sedentary lifestyle greatly increases your risk for illness    A healthy diet, regular physical exercise & weight monitoring are important for maintaining a healthy lifestyle    You may be retaining fluid if you have a history of heart failure or if you experience any of the following symptoms:  Weight gain of 3 pounds or more overnight or 5 pounds in a week, increased swelling in our hands or feet or shortness of breath while lying flat in bed.   Please call your doctor as soon as you notice any of these symptoms; do not wait until your next office visit. Recognize signs and symptoms of STROKE:    F-face looks uneven    A-arms unable to move or move unevenly    S-speech slurred or non-existent    T-time-call 911 as soon as signs and symptoms begin-DO NOT go       Back to bed or wait to see if you get better-TIME IS BRAIN. MyChart Activation    Thank you for requesting access to [facility]. Please follow the instructions below to securely access and download your online medical record. [facility] allows you to send messages to your doctor, view your test results, renew your prescriptions, schedule appointments, and more. How Do I Sign Up? 1. In your internet browser, go to [email or web address]  2. Click on the First Time User? Click Here link in the Sign In box. You will be redirect to the New Member Sign Up page. 3. Enter your [facility] Access Code exactly as it appears below. You will not need to use this code after youve completed the sign-up process. If you do not sign up before the expiration date, you must request a new code. [facility] Access Code: [email or web address]  Expires: 2018 10:27 AM (This is the date your [facility] access code will )    4. Enter the last four digits of your Social Security Number (xxxx) and Date of Birth (mm/dd/yyyy) as indicated and click Submit. You will be taken to the next sign-up page. 5. Create a [facility] ID. This will be your [facility] login ID and cannot be changed, so think of one that is secure and easy to remember. 6. Create a [facility] password. You can change your password at any time. 7. Enter your Password Reset Question and Answer. This can be used at a later time if you forget your password. 8. Enter your e-mail address. You will receive e-mail notification when new information is available in [address] 9. Click Sign Up. You can now view and download portions of your medical record.   10. Click the Download Summary menu link to download a portable copy of your medical information. Additional Information    If you have questions, please visit the Frequently Asked Questions section of the [employer] website at [email or web address] Remember, [employer] is NOT to be used for urgent needs. For medical emergencies, dial 911.

## 2017-12-19 ENCOUNTER — DOCUMENTATION ONLY (OUTPATIENT)
Dept: ORTHOPEDIC SURGERY | Age: 32
End: 2017-12-19

## 2018-01-08 DIAGNOSIS — S73.191A TEAR OF RIGHT ACETABULAR LABRUM, INITIAL ENCOUNTER: ICD-10-CM

## 2018-01-08 DIAGNOSIS — M25.551 RIGHT HIP PAIN: ICD-10-CM

## 2018-01-18 ENCOUNTER — OFFICE VISIT (OUTPATIENT)
Dept: ORTHOPEDIC SURGERY | Age: 33
End: 2018-01-18

## 2018-01-18 VITALS
SYSTOLIC BLOOD PRESSURE: 115 MMHG | DIASTOLIC BLOOD PRESSURE: 82 MMHG | BODY MASS INDEX: 34.15 KG/M2 | TEMPERATURE: 98.3 F | WEIGHT: 205.2 LBS | HEART RATE: 74 BPM | RESPIRATION RATE: 17 BRPM

## 2018-01-18 DIAGNOSIS — M46.1 SACROILIITIS (HCC): Primary | ICD-10-CM

## 2018-01-18 DIAGNOSIS — M25.551 RIGHT HIP PAIN: ICD-10-CM

## 2018-01-18 NOTE — PROGRESS NOTES
[address]  [address], [address]  [address]  Phone: [phone]  Fax: [phone]        Kaylan Olivera  : 1985  PCP: None  2018    PROGRESS NOTE      ASSESSMENT AND PLAN    Jordon Parry comes in to the office today for a right SI joint injection f/u. The procedure decreased his posterior pain levels moderately. At this point, I believe that this is the maximum improvement he is going to see until the hip symptoms are further controlled. Today, he presents with reproducible pain while internal rotating the right hip. This is currently being treated by Dr. Sergio Alas. Pt will f/u after f/u with Dr. Sergio Alas. Diagnoses and all orders for this visit:    1. Sacroiliitis ([facility] [address]    2. Right hip pain       Follow-up Disposition:  Return after f/u with Dr. Sergio Alas. HISTORY OF PRESENT ILLNESS  Jordon Parry is a 28 y.o. male. A&P / HPI from 2017:  Kaycee Holt in to the office today c/o low back pain.       His symptoms are likely myofascial in nature. On the examination he had localized tenderness immediately superior to the PSIS.     I did dry needling in the office today which provided good twitch responses. I also did a trigger point injection that provided immediate relief. I referred him to PT with dry needling and a [facility] based program.       HISTORY OF PRESENT ILLNESS  Yehuda Peters is a 28 y. o. male c/o pain in the lower lumbar region due to a fall at work in 2016. He states that he was on a ladder approximately 5 feet in the air when a step broke and he backwards onto a ladder. He reports that his pain is worse with bending forward. He was treated by Dr. Sergio Alas who informed him that his symptoms were due to hip dysplasia. He was also evaluated by Santhosh Ha who treated him to PT with approximaetly 16 sessions which did not improve his symptoms.  He reports a tingling sensation along the anterior aspect of the right lower extremity. He recently had a thoracic and lumbar MRI which did not show significant evidence of radiculopathy or stenosis.     Pt denies any fevers, chills, nausea, vomiting. Pt denies any chest pain and shortness of breath. Pt denies any ear, nose, and throat problems. Pt denies any fecal or urinary incontinence.       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         Updates from 10/19/17:  Pt presents for a PT f/u. He just completed his PT eval. He reports the exercises provided immediate relief. He reports his symptoms have not changed since his last office visit. The dry needling increased his pain for approximately 3 days and then provided moderate relief. He describes his relief as \"numbness. \" He reports radicular symptoms along the posterior aspect of the right lower extremity that is worse when he is sleeping.           Updates from 11/09/17:  Pt presents for a PT f/u. His pain today is described as constant aching 5/10 with numbness and tingling into the posterior aspect of the RLE ending in the calf. He notes his pain is primarily located in the lumbar region. Through the session, he has been treated with a TENS unit and general strengthening.      Updates from 11/28/17:  Pt presents for a PT f/u. The sessions have continued to improve his pain. However, his symptoms are reproducible when lying on the right side. Updates from 01/18/18:  Pt presents for a right SI joint injection f/u. The procedure decreased his pain level from a 6/10 to a 3/10. His pain continues to be provoked by \"laying on concrete\". He has continued with the PT sessions which has primarily treated the sacroiliitis and right hip pain. PAST MEDICAL HISTORY   Past Medical History:   Diagnosis Date    Asthma        History reviewed. No pertinent surgical history. Jessica Barrett       MEDICATIONS      Current Outpatient Prescriptions   Medication Sig Dispense Refill  naproxen sodium (ALEVE) 220 mg cap Take  by mouth.  valACYclovir (VALTREX) 1 g tablet Take  by mouth.  fexofenadine (ALLEGRA) 30 mg tablet Take 30 mg by mouth two (2) times a day.  gabapentin (NEURONTIN) 300 mg capsule Take 1 Cap by mouth three (3) times daily. 90 Cap 2    naproxen (NAPROSYN) 250 mg tablet Take  by mouth two (2) times daily (with meals).  HYDROcodone-acetaminophen (VICODIN) 5-500 mg per tablet Take  by mouth.  clindamycin (CLEOCIN T) 1 % external solution Apply  to affected area two (2) times a day. use thin film on affected area       fluticasone (FLONASE) 50 mcg/actuation nasal spray nightly. ALLERGIES  No Known Allergies       SOCIAL HISTORY    Social History     Social History    Marital status: SINGLE     Spouse name: N/A    Number of children: N/A    Years of education: N/A     Social History Main Topics    Smoking status: Never Smoker    Smokeless tobacco: Never Used    Alcohol use No    Drug use: None    Sexual activity: Not Asked     Other Topics Concern    None     Social History Narrative       FAMILY HISTORY  History reviewed. No pertinent family history. REVIEW OF SYSTEMS  Review of Systems   Constitutional: Negative for chills, diaphoresis, fever, malaise/fatigue and weight loss. Respiratory: Negative for shortness of breath. Cardiovascular: Negative for chest pain and leg swelling. Gastrointestinal: Negative for constipation, nausea and vomiting. Neurological: Negative for dizziness, tingling, seizures, loss of consciousness, weakness and headaches. Psychiatric/Behavioral: The patient does not have insomnia. PHYSICAL EXAMINATION  Visit Vitals    /82 (BP 1 Location: Left arm, BP Patient Position: Sitting)    Pulse 74    Temp 98.3 °F (36.8 °C) (Oral)    Resp 17    Wt 205 lb 3.2 oz (93.1 kg)    BMI 34.15 kg/m2       Pain Assessment  1/18/2018   Location of Pain Back; Hip   Location Modifiers - Severity of Pain 3   Quality of Pain Aching   Quality of Pain Comment -   Duration of Pain -   Frequency of Pain Constant   Aggravating Factors Walking   Aggravating Factors Comment laying   Limiting Behavior -   Relieving Factors NSAID   Result of Injury -   Work-Related Injury -   Type of Injury -           Constitutional:  Well developed, well nourished, in no acute distress. Psychiatric: Affect and mood are appropriate. Integumentary: No rashes or abrasions noted on exposed areas. SPINE/MUSCULOSKELETAL EXAM    Cervical spine:  Neck is midline. Normal muscle tone. No focal atrophy is noted. ROM pain free. Shoulder ROM intact. No tenderness to palpation. Negative Spurling's sign. Negative Tinel's sign. Negative Campbell's sign.       Sensation in the bilateral arms grossly intact to light touch.       Lumbar spine:  No rash, ecchymosis, or gross obliquity. No fasciculations. No focal atrophy is noted. No pain with hip ROM. Full range of motion. Tenderness to palpation  No tenderness to palpation at the sciatic notch. SI joints non-tender. Negative DAVID test   Positive right P4 test   Negative Compression test  Positive Distraction test  Trochanters non tender.      Sensation in the bilateral legs grossly intact to light touch.     Updates from 11/28/17:  Positive right DAVID   Positive right P4   Positive distraction   Negative compression   Positive right Gaenslen's test    Updates from 01/18/18:  Pain with internal rotation of the right hip    MOTOR:      Biceps  Triceps Deltoids Wrist Ext Wrist Flex Hand Intrin   Right 5/5 5/5 5/5 5/5 5/5 5/5   Left 5/5 5/5 5/5 5/5 5/5 5/5             Hip Flex  Quads Hamstrings Ankle DF EHL Ankle PF   Right 5/5 5/5 5/5 5/5 5/5 5/5   Left 5/5 5/5 5/5 5/5 5/5 5/5     DTRs are 2+ biceps, triceps, brachioradialis, patella, and Achilles.      Negative Straight Leg raise. Squat not tested.    No difficulty with tandem gait.       Ambulation without assistive device. FWB.       RADIOGRAPHS  Thoracic MRI images taken on 02/17/2017 personally reviewed with patient:  Impression:   1. No acute compression deformity  2. Normal spinal cord signal.   3. No significant central canal or foraminal stenosis at any level. Lumbar MRI images taken on 02/17/2017 personally reviewed with patient:  Impression:   No focal disc protrusion. No significant central canal or foraminal stenosis at any level.       [facility]  OFFICE PROCEDURE PROGRESS NOTE          PROCEDURE: In the office today after informed consent using aseptic technique, the patient was injected with a total of 2 cc of Celestone, 0.5 cc each of Lidocaine and Marcaine into his lumbar paraspinal muscles.       Chart reviewed for the following:                        I, Dr. Brett Skiff, have reviewed the History, Physical and updated the Allergic reactions for [address] performed immediately prior to start of procedure:                        I, Dr. Brett Skiff, have performed the following reviews [facility] [address] to the start of the procedure:      * Patient was identified by name and date of birth   * Agreement on procedure being performed was verified  * Risks and Benefits explained to the patient  * Procedure site verified and marked as necessary  * Patient was positioned for comfort  * Consent was signed and verified      Date of procedure: 11/9/2017      Procedure performed by: Ines Prakash MD      Provider assisted by: Ashley Wong  Patient assisted by: Self      How tolerated by patient: Pt tolerated the procedure well with no complications.   Lucia Jacob   reviewed      Mr. Peters has a reminder for a \"due or due soon\" health maintenance. I have asked that he contact his primary care provider for follow-up on this health maintenance.       This plan was reviewed with the patient and patient agrees.  All questions were answered. More than half of this visit today was spent on counseling.      Written by Klarissa Calhoun, as dictated by Dr. Jayde Avila, Dr. Viet Land, confirm that all documentation is accurate.

## 2018-01-18 NOTE — MR AVS SNAPSHOT
[address] [facility] [address] [facility] [address] 
[phone] Patient: Rosalie Rogers MRN: [id number] Visit Information Date & Time Provider Department Dept. Phone Encounter #  
 1/18/2018  3:30 PM Elias Aguilera MD [facility] [phone] [id number] Follow-up Instructions Return after f/u with Dr. Roel Velasco. Your Appointments 1/18/2018  3:30 PM  
Follow Up with Elias Aguilera MD  
[address] [place] [address], [address] and [facility] [address] [place] [address]) Appt Note: Right SI injection. Lissa Castro gabriele from 1/4  
 [facility] [address] [facility] [address]  
[phone]  
  
   
 [address] [place] [address] Avenue  
  
    
 1/24/2018  7:45 AM  
DIAG TEST F/U with Abhishek Araujo MD  
[address] [place] [address], [address] and [facility] [address] [place] [address]) Appt Note: carly hips mri f/u  
 [address] [place], [address] [facility] [address]  
[phone]  
  
   
 [address] [place], [address] [facility] [address] Upcoming Health Maintenance Date Due Pneumococcal 19-64 Medium Risk (1 of 1 - PPSV23) 3/2/2004 DTaP/Tdap/Td series (1 - Tdap) 3/2/2006 Influenza Age 5 to Adult 8/1/2017 Allergies as of 1/18/2018  Review Complete On: 1/18/2018 By: Luis Fernando Zabala No Known Allergies Current Immunizations  Never Reviewed No immunizations on file. Not reviewed this visit You Were Diagnosed With   
  
 Codes Comments Sacroiliitis ([facility] [address]    -  Primary ICD-10-CM: M46.1 ICD-9-CM: 720.2 Vitals BP Pulse Temp Resp Weight(growth percentile) BMI  
 115/82 (BP 1 Location: Left arm, BP Patient Position: Sitting) 74 98.3 °F (36.8 °C) (Oral) 17 205 lb 3.2 oz (93.1 kg) 34.15 kg/m2 Smoking Status Never Smoker BMI and BSA Data  Body Mass Index Body Surface Area  
 34.15 kg/m 2 2.07 m 2  
  
  
 Preferred Pharmacy Pharmacy Name Phone [address], [address] [place] [address] [phone] Your Updated Medication List  
  
   
This list is accurate as of: 1/18/18  3:10 PM.  Always use your most recent med list.  
  
  
  
  
 ALEVE 220 mg Cap Generic drug:  naproxen sodium Take  by mouth. clindamycin 1 % external solution Commonly known as:  CLEOCIN T Apply  to affected area two (2) times a day. use thin film on affected area  
  
 fexofenadine 30 mg tablet Commonly known as:  Baby Bigger Take 30 mg by mouth two (2) times a day. FLONASE 50 mcg/actuation nasal spray Generic drug:  fluticasone  
nightly.  
  
 gabapentin 300 mg capsule Commonly known as:  NEURONTIN Take 1 Cap by mouth three (3) times daily. NAPROSYN 250 mg tablet Generic drug:  naproxen Take  by mouth two (2) times daily (with meals). VALTREX 1 gram tablet Generic drug:  valACYclovir Take  by mouth. VICODIN 5-500 mg per tablet Generic drug:  HYDROcodone-acetaminophen Take  by mouth. Follow-up Instructions Return after f/u with Dr. Ruperto Helm. Introducing \[facility]\"" & HEALTH SERVICES! Shanna Pritchard introduces [facility] [facility] patient portal. Now you can access parts of your medical record, email your doctor's office, and request medication refills online. 1. In your internet browser, go to [email or web address] 2. Click on the First Time User? Click Here link in the Sign In box. You will see the New Member Sign Up page. 3. Enter your [facility] Access Code exactly as it appears below. You will not need to use this code after youve completed the sign-up process. If you do not sign up before the expiration date, you must request a new code. · [facility] Access Code: [email or web address] Expires: 2/7/2018 10:27 AM 
 
4.  Enter the last four digits of your Social Security Number (xxxx) and Date of Birth (mm/dd/yyyy) as indicated and click Submit. You will be taken to the next sign-up page. 5. Create a [employer] ID. This will be your [employer] login ID and cannot be changed, so think of one that is secure and easy to remember. 6. Create a [employer] password. You can change your password at any time. 7. Enter your Password Reset Question and Answer. This can be used at a later time if you forget your password. 8. Enter your e-mail address. You will receive e-mail notification when new information is available in [address] 9. Click Sign Up. You can now view and download portions of your medical record. 10. Click the Download Summary menu link to download a portable copy of your medical information. If you have questions, please visit the Frequently Asked Questions section of the [employer] website. Remember, [employer] is NOT to be used for urgent needs. For medical emergencies, dial 911. Now available from your iPhone and Android! Please provide this summary of care documentation to your next provider. Your primary care clinician is listed as NONE. If you have any questions after today's visit, please call [phone].

## 2018-01-24 ENCOUNTER — OFFICE VISIT (OUTPATIENT)
Dept: ORTHOPEDIC SURGERY | Facility: CLINIC | Age: 33
End: 2018-01-24

## 2018-01-24 VITALS
SYSTOLIC BLOOD PRESSURE: 128 MMHG | TEMPERATURE: 96.2 F | HEIGHT: 65 IN | DIASTOLIC BLOOD PRESSURE: 86 MMHG | HEART RATE: 59 BPM | WEIGHT: 205.6 LBS | BODY MASS INDEX: 34.26 KG/M2 | RESPIRATION RATE: 18 BRPM | OXYGEN SATURATION: 98 %

## 2018-01-24 DIAGNOSIS — M25.551 RIGHT HIP PAIN: Primary | ICD-10-CM

## 2018-01-24 DIAGNOSIS — S73.191D TEAR OF RIGHT ACETABULAR LABRUM, SUBSEQUENT ENCOUNTER: ICD-10-CM

## 2018-01-24 DIAGNOSIS — M70.61 TROCHANTERIC BURSITIS OF RIGHT HIP: ICD-10-CM

## 2018-01-24 NOTE — PROGRESS NOTES
Patient: Jessy Cobb                MRN: [id number]       SSN: [id number]  YOB: 1985        AGE: 28 y.o. SEX: male  Body mass index is 34.21 kg/(m^2). PCP: None  01/24/18    HISTORY: Mr. Kane Zaman returns in follow up with regards to right back and hip pain. It is to be remembered there was an injury. He fell through a ladder. The pain of the hip and back is worse when he is sitting or when he is bending forward. He has had a recent SI injection, which took some of his pain away, and he has a lot of pain sitting. He does point to the groin area. He has no true catching or locking, but it does hurt him in the groin. It also hurts him to roll over on it at night as well. He denies numbness or tingling. Otherwise, there are no complaints of fevers, chills, night sweats, or weight loss. He is otherwise feeling well. PHYSICAL EXAMINATION:  On examination today, he actually walks fairly well. There is no significant antalgic component to the gait. The left hip rotates normally. The right hip as well, there may be just a little discomfort with internal rotation, flexion, and adduction. There is no clicking. He is tender over the greater trochanter. The low back is mildly tender as well. Sensation is normal.  There is no foot drop. Straight leg raise is negative. RADIOGRAPHS:  MRI arthrogram review reveals a small labral tear. Otherwise, it is fairly negative on the right side. The cartilage is fairly intact. IMPRESSION:  I think we are treating three issues:    1. Continued low back problems and pain with sitting and also pain with moving. 2. Bursitis of the right hip. 3. Small labral tear. PLAN:  I think we should treat the bursitis with some therapy initially and possible injection later.   With regards to the groin discomfort, I would recommend both a diagnostic and therapeutic injection with lidocaine and Kenalog to see if it takes any of his pain away. If it does, he may be a candidate for an arthroscopy. If it does not, I would just watch the small labral tear currently. I explained it is really up to him as to the aggressiveness of the treatment and his discomfort. We will see him back after some therapy for the bursitis, as well as the intraarticular injection to see if it makes any difference with regards to his pain status. REVIEW OF SYSTEMS:      CON: negative for weight loss, fever  EYE: negative for double vision  ENT: negative for hoarseness  RS:   negative for Tb  GI:    negative for blood in stool  :  negative for blood in urine  Other systems reviewed and noted below. Past Medical History:   Diagnosis Date    Asthma        History reviewed. No pertinent family history. Current Outpatient Prescriptions   Medication Sig Dispense Refill    gabapentin (NEURONTIN) 300 mg capsule Take 1 Cap by mouth three (3) times daily. 90 Cap 2    naproxen sodium (ALEVE) 220 mg cap Take  by mouth.  naproxen (NAPROSYN) 250 mg tablet Take  by mouth two (2) times daily (with meals).  HYDROcodone-acetaminophen (VICODIN) 5-500 mg per tablet Take  by mouth.  valACYclovir (VALTREX) 1 g tablet Take  by mouth.  fexofenadine (ALLEGRA) 30 mg tablet Take 30 mg by mouth two (2) times a day.  clindamycin (CLEOCIN T) 1 % external solution Apply  to affected area two (2) times a day. use thin film on affected area       fluticasone (FLONASE) 50 mcg/actuation nasal spray nightly. No Known Allergies    History reviewed. No pertinent surgical history. Social History     Social History    Marital status: SINGLE     Spouse name: N/A    Number of children: N/A    Years of education: N/A     Occupational History    Not on file.      Social History Main Topics    Smoking status: Never Smoker    Smokeless tobacco: Never Used    Alcohol use No    Drug use: Not on file    Sexual activity: Not on file Other Topics Concern    Not on file     Social History Narrative       Visit Vitals    /86    Pulse (!) 59    Temp 96.2 °F (35.7 °C) (Oral)    Resp 18    Ht 5' 5\" (1.651 m)    Wt 93.3 kg (205 lb 9.6 oz)    SpO2 98%    BMI 34.21 kg/m2         PHYSICAL EXAMINATION:  GENERAL: Alert and oriented x3, in no acute distress, well-developed, well-nourished, afebrile. HEART: No JVD. EYES: No scleral icterus   NECK: No significant lymphadenopathy   LUNGS: No respiratory compromise or indrawing  ABDOMEN: Soft, non-tender, non-distended. Electronically signed by:  Korina Marshall MD

## 2018-03-23 ENCOUNTER — OFFICE VISIT (OUTPATIENT)
Dept: ORTHOPEDIC SURGERY | Age: 33
End: 2018-03-23

## 2018-03-23 VITALS
BODY MASS INDEX: 32.56 KG/M2 | HEART RATE: 85 BPM | DIASTOLIC BLOOD PRESSURE: 84 MMHG | RESPIRATION RATE: 14 BRPM | SYSTOLIC BLOOD PRESSURE: 120 MMHG | WEIGHT: 202.6 LBS | HEIGHT: 66 IN

## 2018-03-23 DIAGNOSIS — M25.551 RIGHT HIP PAIN: ICD-10-CM

## 2018-03-23 DIAGNOSIS — M46.1 SACROILIITIS (HCC): Primary | ICD-10-CM

## 2018-03-23 NOTE — PROGRESS NOTES
[address], [address]  [place], [address] [place] [address] Street  Phone: [phone]  Fax: [phone]        Cierra Goddard  : 1985  PCP: None  3/23/2018    PROGRESS NOTE      ASSESSMENT AND PLAN    Faisal Garcia comes in to the office today for PRN f/u. He occasionally has pain in his right SI joint. Overall, he has seen some improvement. On examination, he had tenderness to palpation of the QL region on the R. We discussed trigger point injections. He sees Dr. Jaylyn Franks for right hip pain that is reproduced with internal rotation. I referred for a right intraarticular hip injection. Pt will f/u w/ Dr. Jaylyn Franks 2 weeks post injection. Diagnoses and all orders for this visit:    1. Sacroiliitis ([address]    2. Right hip pain  -     SCHEDULE SURGERY       Follow-up Disposition: Not on File      HISTORY OF PRESENT ILLNESS  Faisal Garcia is a 35 y.o. male. A&P / HPI from 2017:  Alexsander Andrew in to the office today c/o low back pain.       His symptoms are likely myofascial in nature. On the examination he had localized tenderness immediately superior to the PSIS.     I did dry needling in the office today which provided good twitch responses. I also did a trigger point injection that provided immediate relief. I referred him to PT with dry needling and a [facility] based program.       HISTORY OF PRESENT ILLNESS  Alecia Gottron Seamster is a 28 y. o. male c/o pain in the lower lumbar region due to a fall at work in 2016. He states that he was on a ladder approximately 5 feet in the air when a step broke and he backwards onto a ladder. He reports that his pain is worse with bending forward. He was treated by Dr. Jaylyn Franks who informed him that his symptoms were due to hip dysplasia.  He was also evaluated by Santhosh Ha who treated him to PT with approximaetly 16 sessions which did not improve his symptoms. He reports a tingling sensation along the anterior aspect of the right lower extremity. He recently had a thoracic and lumbar MRI which did not show significant evidence of radiculopathy or stenosis.     Pt denies any fevers, chills, nausea, vomiting. Pt denies any chest pain and shortness of breath. Pt denies any ear, nose, and throat problems. Pt denies any fecal or urinary incontinence.       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         Updates from 10/19/17:  Pt presents for a PT f/u. He just completed his PT eval. He reports the exercises provided immediate relief. He reports his symptoms have not changed since his last office visit. The dry needling increased his pain for approximately 3 days and then provided moderate relief. He describes his relief as \"numbness. \" He reports radicular symptoms along the posterior aspect of the right lower extremity that is worse when he is sleeping.           Updates from 11/09/17:  Pt presents for a PT f/u. His pain today is described as constant aching 5/10 with numbness and tingling into the posterior aspect of the RLE ending in the calf. He notes his pain is primarily located in the lumbar region. Through the session, he has been treated with a TENS unit and general strengthening.       Updates from 11/28/17:  Pt presents for a PT f/u. The sessions have continued to improve his pain. However, his symptoms are reproducible when lying on the right side.      Updates from 01/18/18:  Pt presents for a right SI joint injection f/u. The procedure decreased his pain level from a 6/10 to a 3/10. His pain continues to be provoked by \"laying on concrete\". He has continued with the PT sessions which has primarily treated the sacroiliitis and right hip pain. Updates from 03/23/18:  Pt presents for PRN f/u. He occasionally has pain in his right SI joint. Overall, he has seen some improvement. He sees Dr. Billye Brittle for right hip pain.        PAST MEDICAL HISTORY Past Medical History:   Diagnosis Date    Asthma        No past surgical history on file. Jun Gains MEDICATIONS    Current Outpatient Prescriptions   Medication Sig Dispense Refill    gabapentin (NEURONTIN) 300 mg capsule Take 1 Cap by mouth three (3) times daily. 90 Cap 2    naproxen sodium (ALEVE) 220 mg cap Take  by mouth.  naproxen (NAPROSYN) 250 mg tablet Take  by mouth two (2) times daily (with meals).  HYDROcodone-acetaminophen (VICODIN) 5-500 mg per tablet Take  by mouth.  valACYclovir (VALTREX) 1 g tablet Take  by mouth.  fexofenadine (ALLEGRA) 30 mg tablet Take 30 mg by mouth two (2) times a day.  clindamycin (CLEOCIN T) 1 % external solution Apply  to affected area two (2) times a day. use thin film on affected area       fluticasone (FLONASE) 50 mcg/actuation nasal spray nightly. ALLERGIES  No Known Allergies       SOCIAL HISTORY    Social History     Social History    Marital status: SINGLE     Spouse name: N/A    Number of children: N/A    Years of education: N/A     Social History Main Topics    Smoking status: Never Smoker    Smokeless tobacco: Never Used    Alcohol use No    Drug use: Not on file    Sexual activity: Not on file     Other Topics Concern    Not on file     Social History Narrative       FAMILY HISTORY  No family history on file. REVIEW OF SYSTEMS  Review of Systems   Musculoskeletal: Positive for joint pain.         Right hip pain          PHYSICAL EXAMINATION  Visit Vitals    /84    Pulse 85    Resp 14    Ht 5' 6\" (1.676 m)    Wt 202 lb 9.6 oz (91.9 kg)    BMI 32.7 kg/m2       Pain Assessment  1/24/2018   Location of Pain Hip   Location Modifiers Right   Severity of Pain 6   Quality of Pain Aching   Quality of Pain Comment -   Duration of Pain Persistent   Frequency of Pain Constant   Aggravating Factors Bending;Kneeling   Aggravating Factors Comment -   Limiting Behavior Yes   Relieving Factors Elevation   Result of Injury Yes   Work-Related Injury Yes   Type of Injury Fall           Constitutional:  Well developed, well nourished, in no acute distress. Psychiatric: Affect and mood are appropriate. Integumentary: No rashes or abrasions noted on exposed areas. SPINE/MUSCULOSKELETAL EXAM    Cervical spine:  Neck is midline. Normal muscle tone. No focal atrophy is noted. ROM pain free. Shoulder ROM intact. No tenderness to palpation. Negative Spurling's sign. Negative Tinel's sign. Negative Campbell's sign.       Sensation in the bilateral arms grossly intact to light touch.       Lumbar spine:  No rash, ecchymosis, or gross obliquity. No fasciculations. No focal atrophy is noted. No pain with hip ROM. Full range of motion. Tenderness to palpation  No tenderness to palpation at the sciatic notch. SI joints non-tender. Negative DAVID test   Positive right P4 test   Negative Compression test  Positive Distraction test  Trochanters non tender.      Sensation in the bilateral legs grossly intact to light touch.      Updates from 11/28/17:  Positive right DAVID   Positive right P4   Positive distraction   Negative compression   Positive right Gaenslen's test     Updates from 01/18/18:  Pain with internal rotation of the right hip    Updates from 3/23/18:  Mild tenderness to palpation of R SI joint  Pain with internal rotation of the right hip    MOTOR:      Biceps  Triceps Deltoids Wrist Ext Wrist Flex Hand Intrin   Right 5/5 5/5 5/5 5/5 5/5 5/5   Left 5/5 5/5 5/5 5/5 5/5 5/5             Hip Flex  Quads Hamstrings Ankle DF EHL Ankle PF   Right 5/5 5/5 5/5 5/5 5/5 5/5   Left 5/5 5/5 5/5 5/5 5/5 5/5     DTRs are 2+ biceps, triceps, brachioradialis, patella, and Achilles.      Negative Straight Leg raise. Squat not tested. No difficulty with tandem gait.       Ambulation without assistive device. FWB.         RADIOGRAPHS  Thoracic MRI images taken on 02/17/2017 personally reviewed with patient:  Impression:   1. No acute compression deformity  2. Normal spinal cord signal.   3. No significant central canal or foraminal stenosis at any level. Lumbar MRI images taken on 02/17/2017 personally reviewed with patient:  Impression:   No focal disc protrusion. No significant central canal or foraminal stenosis at any level. 7 minutes of face-to-face contact were spent with the patient during today's visit extensively discussing symptoms and treatment plan. All questions were answered. More than half of this visit today was spent on counseling.      Written by Chaparrita Rivera as dictated by Norah Flores MD

## 2018-03-23 NOTE — MR AVS SNAPSHOT
[address] [place] [address] [address] [address] 
[phone] Patient: Juliet Hightower MRN: [id number] COT:[birth year] Visit Information Date & Time Provider Department Dept. Phone Encounter #  
 3/23/2018  3:15 PM Eddie Jara MD [facility] [phone] [id number] Follow-up Instructions Return with Dr. Jodie Brown 2 weeks post injection. Upcoming Health Maintenance Date Due Pneumococcal 19-64 Medium Risk (1 of 1 - PPSV23) 3/2/2004 DTaP/Tdap/Td series (1 - Tdap) 3/2/2006 Influenza Age 5 to Adult 8/1/2017 Allergies as of 3/23/2018  Review Complete On: 3/23/2018 By: Gabrielle Banda LPN No Known Allergies Current Immunizations  Never Reviewed No immunizations on file. Not reviewed this visit You Were Diagnosed With   
  
 Codes Comments Sacroiliitis ([facility]    -  Primary ICD-10-CM: M46.1 ICD-9-CM: 720.2 Right hip pain     ICD-10-CM: M25.551 ICD-9-CM: 719.45 Vitals BP Pulse Resp Height(growth percentile) Weight(growth percentile) BMI  
 120/84 85 14 5' 6\" (1.676 m) 202 lb 9.6 oz (91.9 kg) 32.7 kg/m2 Smoking Status Never Smoker Vitals History BMI and BSA Data Body Mass Index Body Surface Area 32.7 kg/m 2 2.07 m 2 Preferred Pharmacy Pharmacy Name Phone [address], [address] [place] [address] [phone] Your Updated Medication List  
  
   
This list is accurate as of 3/23/18  3:48 PM.  Always use your most recent med list.  
  
  
  
  
 ALEVE 220 mg Cap Generic drug:  naproxen sodium Take  by mouth. clindamycin 1 % external solution Commonly known as:  CLEOCIN T Apply  to affected area two (2) times a day. use thin film on affected area  
  
 fexofenadine 30 mg tablet Commonly known as:  Louvenia Bolognese Take 30 mg by mouth two (2) times a day. FLONASE 50 mcg/actuation nasal spray Generic drug:  fluticasone  
nightly.  
  
 gabapentin 300 mg capsule Commonly known as:  NEURONTIN Take 1 Cap by mouth three (3) times daily. NAPROSYN 250 mg tablet Generic drug:  naproxen Take  by mouth two (2) times daily (with meals). VALTREX 1 gram tablet Generic drug:  valACYclovir Take  by mouth. VICODIN 5-500 mg per tablet Generic drug:  HYDROcodone-acetaminophen Take  by mouth. Follow-up Instructions Return with Dr. Mack Franks 2 weeks post injection. Introducing [facility]! Alannah Martinez introduces [employer] patient portal. Now you can access parts of your medical record, email your doctor's office, and request medication refills online. 1. In your internet browser, go to [email or web address] 2. Click on the First Time User? Click Here link in the Sign In box. You will see the New Member Sign Up page. 3. Enter your [employer] Access Code exactly as it appears below. You will not need to use this code after youve completed the sign-up process. If you do not sign up before the expiration date, you must request a new code. · [employer] Access Code: [email or web address] Expires: 6/18/2018  2:44 PM 
 
4. Enter the last four digits of your Social Security Number (xxxx) and Date of Birth (mm/dd/yyyy) as indicated and click Submit. You will be taken to the next sign-up page. 5. Create a [employer] ID. This will be your [employer] login ID and cannot be changed, so think of one that is secure and easy to remember. 6. Create a [employer] password. You can change your password at any time. 7. Enter your Password Reset Question and Answer. This can be used at a later time if you forget your password. 8. Enter your e-mail address. You will receive e-mail notification when new information is available in [address] 9. Click Sign Up. You can now view and download portions of your medical record. 10. Click the Download Summary menu link to download a portable copy of your medical information. If you have questions, please visit the Frequently Asked Questions section of the [employer] website. Remember, [employer] is NOT to be used for urgent needs. For medical emergencies, dial 911. Now available from your iPhone and Android! Please provide this summary of care documentation to your next provider. Your primary care clinician is listed as NONE. If you have any questions after today's visit, please call [phone].

## 2019-01-04 ENCOUNTER — OFFICE VISIT (OUTPATIENT)
Dept: ORTHOPEDIC SURGERY | Age: 34
End: 2019-01-04

## 2019-01-04 VITALS
DIASTOLIC BLOOD PRESSURE: 96 MMHG | TEMPERATURE: 97.1 F | RESPIRATION RATE: 16 BRPM | SYSTOLIC BLOOD PRESSURE: 140 MMHG | HEART RATE: 95 BPM | OXYGEN SATURATION: 96 % | BODY MASS INDEX: 32.95 KG/M2 | HEIGHT: 66 IN | WEIGHT: 205 LBS

## 2019-01-04 DIAGNOSIS — M70.61 TROCHANTERIC BURSITIS OF RIGHT HIP: ICD-10-CM

## 2019-01-04 DIAGNOSIS — M25.551 RIGHT HIP PAIN: Primary | ICD-10-CM

## 2019-01-04 RX ORDER — BETAMETHASONE SODIUM PHOSPHATE AND BETAMETHASONE ACETATE 3; 3 MG/ML; MG/ML
6 INJECTION, SUSPENSION INTRA-ARTICULAR; INTRALESIONAL; INTRAMUSCULAR; SOFT TISSUE ONCE
Qty: 1 ML | Refills: 0
Start: 2019-01-04 | End: 2019-01-04

## 2019-01-04 NOTE — PROGRESS NOTES
Patient: Fariha Eugene                MRN: [id number]       SSN: [id number] YOB: 1985        AGE: 35 y.o. SEX: male Body mass index is 33.09 kg/m². PCP: None 01/04/19 HISTORY: Mr. Ascnecion Johnson returns in follow up with regards to right hip pain. It is to be remembered a ladder broke, and he fell about four or five feet. He had some bursitis of the hip, which was treated both with an injection and therapy. He is requesting another injection. He is still having some groin discomfort as well. It is intermittent. Sometimes, he will have some catching and sometimes not. He has had an MRI arthrogram in the past, which did reveal a small, non-detached anterior labral tear. I think his hip flexor was a little bit tweaked at that time as well. PHYSICAL EXAMINATION:  On examination today, he actually walks fairly normally. He is a very pleasant gentleman in no acute distress. He moves the head and neck adequately. There is no respiratory compromise or indrawing. There is no scleral icterus. There is no JVD. Abdominal exam is nontender. He does get some tenderness in the hip with flexion, adduction, and internal rotation, which is referred to the groin arear and some tenderness over the trochanter. The low back is not particularly tender. He is neurologically intact. He is a very, very pleasant gentleman. RADIOGRAPHS:  I did review his x-rays, AP of the pelvis, he has mild hip dysplasia and no acute fracture. He does have a little bit of CAM impingement. Interestingly enough, his CAM impingement is worse on the left side than it is on the right side, but he does have a little bit of CAM impingement, and there may be a little bit of pincer deformity as well as a result of his hip dysplasia.   
 
PROCEDURE:  Under aseptic conditions and after informed, written consent with a time out, the right trochanteric bursa was injected with 1 cc of the Celestone preparation, i.e. 6 mg, which was well tolerated. PLAN:  I would like him to have an intraarticular injection with some cortisone and Marcaine or Lidocaine for both diagnostic and therapeutic measures. If it does take his groin pain away, then I certainly would recommend an arthroscopy for him. If it does not make a difference or does not change anything, I would hold off on the surgery for the time being. He is going to return to see us after the intraarticular injection to see if it changed any of his symptomatology. It has been a pleasure to share in his care. REVIEW OF SYSTEMS:   
 
CON: negative for weight loss, fever EYE: negative for double vision ENT: negative for hoarseness RS:   negative for Tb 
GI:    negative for blood in stool :  negative for blood in urine Other systems reviewed and noted below. Past Medical History:  
Diagnosis Date  Asthma History reviewed. No pertinent family history. Current Outpatient Medications Medication Sig Dispense Refill  betamethasone (CELESTONE SOLUSPAN) 6 mg/mL injection 1 mL by Intra artICUlar route once for 1 dose. 1 mL 0  
 naproxen (NAPROSYN) 250 mg tablet Take  by mouth two (2) times daily (with meals).  gabapentin (NEURONTIN) 300 mg capsule Take 1 Cap by mouth three (3) times daily. 90 Cap 2  
 naproxen sodium (ALEVE) 220 mg cap Take  by mouth.  HYDROcodone-acetaminophen (VICODIN) 5-500 mg per tablet Take  by mouth.  valACYclovir (VALTREX) 1 g tablet Take  by mouth.  fexofenadine (ALLEGRA) 30 mg tablet Take 30 mg by mouth two (2) times a day.  clindamycin (CLEOCIN T) 1 % external solution Apply  to affected area two (2) times a day. use thin film on affected area  fluticasone (FLONASE) 50 mcg/actuation nasal spray nightly. No Known Allergies History reviewed. No pertinent surgical history. Social History Socioeconomic History  Marital status: SINGLE Spouse name: Not on file  Number of children: Not on file  Years of education: Not on file  Highest education level: Not on file Social Needs  Financial resource strain: Not on file  Food insecurity - worry: Not on file  Food insecurity - inability: Not on file  Transportation needs - medical: Not on file  Transportation needs - non-medical: Not on file Occupational History  Not on file Tobacco Use  Smoking status: Never Smoker  Smokeless tobacco: Never Used Substance and Sexual Activity  Alcohol use: No  
 Drug use: Not on file  Sexual activity: Not on file Other Topics Concern  Not on file Social History Narrative  Not on file Visit Vitals BP (!) 140/96 (BP 1 Location: Left arm, BP Patient Position: Sitting) Pulse 95 Temp 97.1 °F (36.2 °C) (Oral) Resp 16 Ht 5' 6\" (1.676 m) Wt 205 lb (93 kg) SpO2 96% BMI 33.09 kg/m² PHYSICAL EXAMINATION: 
GENERAL: Alert and oriented x3, in no acute distress, well-developed, well-nourished, afebrile. HEART: No JVD. EYES: No scleral icterus NECK: No significant lymphadenopathy LUNGS: No respiratory compromise or indrawing ABDOMEN: Soft, non-tender, non-distended. Electronically signed by:  Alize Bernal MD

## 2020-11-30 ENCOUNTER — HOSPITAL ENCOUNTER (EMERGENCY)
Age: 35
Discharge: HOME | End: 2020-11-30
Attending: EMERGENCY MEDICINE
Payer: COMMERCIAL

## 2020-11-30 ENCOUNTER — APPOINTMENT (OUTPATIENT)
Dept: GENERAL RADIOLOGY | Age: 35
End: 2020-11-30
Attending: EMERGENCY MEDICINE
Payer: COMMERCIAL

## 2020-11-30 VITALS
HEART RATE: 89 BPM | HEIGHT: 66 IN | OXYGEN SATURATION: 98 % | TEMPERATURE: 99.1 F | RESPIRATION RATE: 20 BRPM | BODY MASS INDEX: 30.53 KG/M2 | DIASTOLIC BLOOD PRESSURE: 59 MMHG | SYSTOLIC BLOOD PRESSURE: 105 MMHG | WEIGHT: 190 LBS

## 2020-11-30 DIAGNOSIS — R50.9 FEBRILE ILLNESS: ICD-10-CM

## 2020-11-30 DIAGNOSIS — Z20.822 SUSPECTED COVID-19 VIRUS INFECTION: Primary | ICD-10-CM

## 2020-11-30 LAB
ALBUMIN SERPL-MCNC: 3.8 G/DL (ref 3.4–5)
ALBUMIN/GLOB SERPL: 1 {RATIO} (ref 0.8–1.7)
ALP SERPL-CCNC: 76 U/L (ref 45–117)
ALT SERPL-CCNC: 46 U/L (ref 16–61)
ANION GAP SERPL CALC-SCNC: 9 MMOL/L (ref 3–18)
APPEARANCE UR: CLEAR
AST SERPL-CCNC: 25 U/L (ref 10–38)
BASOPHILS # BLD: 0 K/UL (ref 0–0.1)
BASOPHILS NFR BLD: 0 % (ref 0–2)
BILIRUB SERPL-MCNC: 1.5 MG/DL (ref 0.2–1)
BILIRUB UR QL: NEGATIVE
BUN SERPL-MCNC: 16 MG/DL (ref 7–18)
BUN/CREAT SERPL: 14 (ref 12–20)
CALCIUM SERPL-MCNC: 9.1 MG/DL (ref 8.5–10.1)
CHLORIDE SERPL-SCNC: 105 MMOL/L (ref 100–111)
CO2 SERPL-SCNC: 24 MMOL/L (ref 21–32)
COLOR UR: YELLOW
CREAT SERPL-MCNC: 1.12 MG/DL (ref 0.6–1.3)
DIFFERENTIAL METHOD BLD: ABNORMAL
EOSINOPHIL # BLD: 0 K/UL (ref 0–0.4)
EOSINOPHIL NFR BLD: 0 % (ref 0–5)
ERYTHROCYTE [DISTWIDTH] IN BLOOD BY AUTOMATED COUNT: 13.5 % (ref 11.6–14.5)
FLUAV AG NPH QL IA: NEGATIVE
FLUBV AG NOSE QL IA: NEGATIVE
GLOBULIN SER CALC-MCNC: 3.7 G/DL (ref 2–4)
GLUCOSE SERPL-MCNC: 122 MG/DL (ref 74–99)
GLUCOSE UR STRIP.AUTO-MCNC: NEGATIVE MG/DL
HCT VFR BLD AUTO: 44.8 % (ref 36–48)
HGB BLD-MCNC: 15.4 G/DL (ref 13–16)
HGB UR QL STRIP: NEGATIVE
KETONES UR QL STRIP.AUTO: ABNORMAL MG/DL
LACTATE BLD-SCNC: 1.15 MMOL/L (ref 0.4–2)
LEUKOCYTE ESTERASE UR QL STRIP.AUTO: ABNORMAL
LYMPHOCYTES # BLD: 1.3 K/UL (ref 0.9–3.6)
LYMPHOCYTES NFR BLD: 9 % (ref 21–52)
MCH RBC QN AUTO: 27.2 PG (ref 24–34)
MCHC RBC AUTO-ENTMCNC: 34.4 G/DL (ref 31–37)
MCV RBC AUTO: 79.2 FL (ref 74–97)
MONOCYTES # BLD: 1.1 K/UL (ref 0.05–1.2)
MONOCYTES NFR BLD: 8 % (ref 3–10)
NEUTS SEG # BLD: 11.6 K/UL (ref 1.8–8)
NEUTS SEG NFR BLD: 83 % (ref 40–73)
NITRITE UR QL STRIP.AUTO: NEGATIVE
PH UR STRIP: 5.5 [PH] (ref 5–8)
PLATELET # BLD AUTO: 222 K/UL (ref 135–420)
PMV BLD AUTO: 9.2 FL (ref 9.2–11.8)
POTASSIUM SERPL-SCNC: 3.5 MMOL/L (ref 3.5–5.5)
PROT SERPL-MCNC: 7.5 G/DL (ref 6.4–8.2)
PROT UR STRIP-MCNC: 30 MG/DL
RBC # BLD AUTO: 5.66 M/UL (ref 4.7–5.5)
SODIUM SERPL-SCNC: 138 MMOL/L (ref 136–145)
SP GR UR REFRACTOMETRY: >1.03 (ref 1–1.03)
UROBILINOGEN UR QL STRIP.AUTO: 1 EU/DL (ref 0.2–1)
WBC # BLD AUTO: 14.1 K/UL (ref 4.6–13.2)
WBC URNS QL MICRO: NORMAL /HPF (ref 0–4)

## 2020-11-30 PROCEDURE — 96375 TX/PRO/DX INJ NEW DRUG ADDON: CPT

## 2020-11-30 PROCEDURE — 80053 COMPREHEN METABOLIC PANEL: CPT

## 2020-11-30 PROCEDURE — 87040 BLOOD CULTURE FOR BACTERIA: CPT

## 2020-11-30 PROCEDURE — 85025 COMPLETE CBC W/AUTO DIFF WBC: CPT

## 2020-11-30 PROCEDURE — 96361 HYDRATE IV INFUSION ADD-ON: CPT

## 2020-11-30 PROCEDURE — 87635 SARS-COV-2 COVID-19 AMP PRB: CPT

## 2020-11-30 PROCEDURE — 93005 ELECTROCARDIOGRAM TRACING: CPT

## 2020-11-30 PROCEDURE — 83605 ASSAY OF LACTIC ACID: CPT

## 2020-11-30 PROCEDURE — 71045 X-RAY EXAM CHEST 1 VIEW: CPT

## 2020-11-30 PROCEDURE — 81001 URINALYSIS AUTO W/SCOPE: CPT

## 2020-11-30 PROCEDURE — 74011250636 HC RX REV CODE- 250/636: Performed by: EMERGENCY MEDICINE

## 2020-11-30 PROCEDURE — 96374 THER/PROPH/DIAG INJ IV PUSH: CPT

## 2020-11-30 PROCEDURE — 74011250637 HC RX REV CODE- 250/637: Performed by: EMERGENCY MEDICINE

## 2020-11-30 PROCEDURE — 87804 INFLUENZA ASSAY W/OPTIC: CPT

## 2020-11-30 PROCEDURE — 99285 EMERGENCY DEPT VISIT HI MDM: CPT

## 2020-11-30 PROCEDURE — 74011000250 HC RX REV CODE- 250: Performed by: EMERGENCY MEDICINE

## 2020-11-30 RX ORDER — DOXYCYCLINE 100 MG/1
100 CAPSULE ORAL 2 TIMES DAILY
Qty: 20 CAP | Refills: 0 | Status: SHIPPED | OUTPATIENT
Start: 2020-11-30 | End: 2020-12-10

## 2020-11-30 RX ORDER — SODIUM CHLORIDE 0.9 % (FLUSH) 0.9 %
5-10 SYRINGE (ML) INJECTION AS NEEDED
Status: DISCONTINUED | OUTPATIENT
Start: 2020-11-30 | End: 2020-12-01 | Stop reason: HOSPADM

## 2020-11-30 RX ORDER — ACETAMINOPHEN 325 MG/1
650 TABLET ORAL
Status: COMPLETED | OUTPATIENT
Start: 2020-11-30 | End: 2020-11-30

## 2020-11-30 RX ADMIN — VANCOMYCIN HYDROCHLORIDE 1000 MG: 1 INJECTION, POWDER, LYOPHILIZED, FOR SOLUTION INTRAVENOUS at 21:23

## 2020-11-30 RX ADMIN — WATER 1 G: 1 INJECTION INTRAMUSCULAR; INTRAVENOUS; SUBCUTANEOUS at 21:17

## 2020-11-30 RX ADMIN — SODIUM CHLORIDE, SODIUM LACTATE, POTASSIUM CHLORIDE, AND CALCIUM CHLORIDE 586 ML: 600; 310; 30; 20 INJECTION, SOLUTION INTRAVENOUS at 22:22

## 2020-11-30 RX ADMIN — SODIUM CHLORIDE, SODIUM LACTATE, POTASSIUM CHLORIDE, AND CALCIUM CHLORIDE 1000 ML: 600; 310; 30; 20 INJECTION, SOLUTION INTRAVENOUS at 20:26

## 2020-11-30 RX ADMIN — ACETAMINOPHEN 650 MG: 325 TABLET ORAL at 19:17

## 2020-11-30 RX ADMIN — SODIUM CHLORIDE, SODIUM LACTATE, POTASSIUM CHLORIDE, AND CALCIUM CHLORIDE 1000 ML: 600; 310; 30; 20 INJECTION, SOLUTION INTRAVENOUS at 21:23

## 2020-12-01 ENCOUNTER — PATIENT OUTREACH (OUTPATIENT)
Dept: CASE MANAGEMENT | Age: 35
End: 2020-12-01

## 2020-12-01 NOTE — PROGRESS NOTES
[facility] Dosing- Initial Progress Note    Pharmacy Consult ordered by Di Valentine      Indication: skin and soft tissue infection    Patient clinical status and labs ordered/reviewed.      Dose: Vancomycin 1750 mg x 1, infused over 120 minutes (24.1 mg/kg AdjBW) followed by 1000 mg every 12 hours, infused over 60 minutes (13.7 mg/kg AdjBW)       PrPk=33 mcg/mL, PrTr=14 mcg/mL, EHV=797      Daily BMP           Continue to monitor    Sign: Colonel Section, PHARMD  Date: 11/30/2020  Time: 9:08 PM

## 2020-12-01 NOTE — ED PROVIDER NOTES
EMERGENCY DEPARTMENT HISTORY AND PHYSICAL EXAM    9:28 PM  Date: 11/30/2020  Patient Name: Driss Cueto    History of Presenting Illness     Chief Complaint   Patient presents with    Fever        History Provided By: Patient    HPI: Driss Cueto is a 28 y.o. male with history of asthma. Patient is presenting with fever and body aches for 1 day. Denies vomiting or diarrhea. No history of cough or shortness of breath. Patient had a dog bite recently and has been following up with hand surgery and getting his rabies series as well as p.o. antibiotics. Reports that the swelling in his hand has improved, he has not been able to flex his fingers which has been stable since the incident. Denies worsening of his hand pain. Location:  Severity:  Timing/course: Onset/Duration:     PCP: None    Past History     Past Medical History:  Past Medical History:   Diagnosis Date    Asthma        Past Surgical History:  History reviewed. No pertinent surgical history. Family History:  History reviewed. No pertinent family history. Social History:  Social History     Tobacco Use    Smoking status: Never Smoker    Smokeless tobacco: Never Used   Substance Use Topics    Alcohol use: No    Drug use: Never       Allergies:  No Known Allergies    Review of Systems   Review of Systems   Constitutional: Positive for fatigue and fever. Musculoskeletal: Positive for myalgias. All other systems reviewed and are negative. Physical Exam     Patient Vitals for the past 12 hrs:   Temp Pulse Resp BP SpO2   11/30/20 2100  (!) 108 20 121/73 96 %   11/30/20 1909 (!) 103 °F (39.4 °C) (!) 130 20 119/85 99 %       Physical Exam  Vitals signs and nursing note reviewed. Constitutional:       Appearance: Normal appearance. HENT:      Head: Normocephalic and atraumatic. Eyes:      Extraocular Movements: Extraocular movements intact. Neck:      Musculoskeletal: Normal range of motion and neck supple. Cardiovascular:      Rate and Rhythm: Normal rate. Pulses: Normal pulses. Pulmonary:      Effort: Pulmonary effort is normal. No respiratory distress. Musculoskeletal:      Comments: Healed well appearing puncture wounds on the dorsum of the left hand. Mild dorsal swelling without erythema or fluctuance. Limited flexion at baseline. Skin:     General: Skin is warm and dry. Neurological:      General: No focal deficit present. Mental Status: He is alert and oriented to person, place, and time. Psychiatric:         Mood and Affect: Mood normal.         Behavior: Behavior normal.         Diagnostic Study Results     Labs -  Recent Results (from the past 12 hour(s))   CBC WITH AUTOMATED DIFF    Collection Time: 11/30/20  8:00 PM   Result Value Ref Range    WBC 14.1 (H) 4.6 - 13.2 K/uL    RBC 5.66 (H) 4.70 - 5.50 M/uL    HGB 15.4 13.0 - 16.0 g/dL    HCT 44.8 36.0 - 48.0 %    MCV 79.2 74.0 - 97.0 FL    MCH 27.2 24.0 - 34.0 PG    MCHC 34.4 31.0 - 37.0 g/dL    RDW 13.5 11.6 - 14.5 %    PLATELET 032 389 - 248 K/uL    MPV 9.2 9.2 - 11.8 FL    NEUTROPHILS 83 (H) 40 - 73 %    LYMPHOCYTES 9 (L) 21 - 52 %    MONOCYTES 8 3 - 10 %    EOSINOPHILS 0 0 - 5 %    BASOPHILS 0 0 - 2 %    ABS. NEUTROPHILS 11.6 (H) 1.8 - 8.0 K/UL    ABS. LYMPHOCYTES 1.3 0.9 - 3.6 K/UL    ABS. MONOCYTES 1.1 0.05 - 1.2 K/UL    ABS. EOSINOPHILS 0.0 0.0 - 0.4 K/UL    ABS.  BASOPHILS 0.0 0.0 - 0.1 K/UL    DF AUTOMATED     METABOLIC PANEL, COMPREHENSIVE    Collection Time: 11/30/20  8:00 PM   Result Value Ref Range    Sodium 138 136 - 145 mmol/L    Potassium 3.5 3.5 - 5.5 mmol/L    Chloride 105 100 - 111 mmol/L    CO2 24 21 - 32 mmol/L    Anion gap 9 3.0 - 18 mmol/L    Glucose 122 (H) 74 - 99 mg/dL    BUN 16 7.0 - 18 MG/DL    Creatinine 1.12 0.6 - 1.3 MG/DL    BUN/Creatinine ratio 14 12 - 20      GFR est AA >60 >60 ml/min/1.73m2    GFR est non-AA >60 >60 ml/min/1.73m2    Calcium 9.1 8.5 - 10.1 MG/DL    Bilirubin, total 1.5 (H) 0.2 - 1.0 MG/DL    ALT (SGPT) 46 16 - 61 U/L    AST (SGOT) 25 10 - 38 U/L    Alk. phosphatase 76 45 - 117 U/L    Protein, total 7.5 6.4 - 8.2 g/dL    Albumin 3.8 3.4 - 5.0 g/dL    Globulin 3.7 2.0 - 4.0 g/dL    A-G Ratio 1.0 0.8 - 1.7     INFLUENZA A & B AG (RAPID TEST)    Collection Time: 11/30/20  8:00 PM   Result Value Ref Range    Influenza A Antigen Negative NEG      Influenza B Antigen Negative NEG     EKG, 12 LEAD, INITIAL    Collection Time: 11/30/20  8:09 PM   Result Value Ref Range    Ventricular Rate 117 BPM    Atrial Rate 117 BPM    P-R Interval 128 ms    QRS Duration 92 ms    Q-T Interval 310 ms    QTC Calculation (Bezet) 432 ms    Calculated P Axis 67 degrees    Calculated R Axis 90 degrees    Calculated T Axis 58 degrees    Diagnosis       Sinus tachycardia  Rightward axis  Borderline ECG  No previous ECGs available     POC LACTIC ACID    Collection Time: 11/30/20  8:24 PM   Result Value Ref Range    Lactic Acid (POC) 1.15 0.40 - 2.00 mmol/L   URINALYSIS W/ RFLX MICROSCOPIC    Collection Time: 11/30/20  8:33 PM   Result Value Ref Range    Color YELLOW      Appearance CLEAR      Specific gravity >1.030 (H) 1.005 - 1.030    pH (UA) 5.5 5.0 - 8.0      Protein 30 (A) NEG mg/dL    Glucose Negative NEG mg/dL    Ketone TRACE (A) NEG mg/dL    Bilirubin Negative NEG      Blood Negative NEG      Urobilinogen 1.0 0.2 - 1.0 EU/dL    Nitrites Negative NEG      Leukocyte Esterase TRACE (A) NEG     URINE MICROSCOPIC ONLY    Collection Time: 11/30/20  8:33 PM   Result Value Ref Range    WBC 0 to 3 0 - 4 /hpf       Radiologic Studies -   Xr Chest Port    Result Date: 11/30/2020  Impression: 1. No acute cardiopulmonary process. Medical Decision Making     ED Course: Progress Notes, Reevaluation, and Consults:    9:28 PM Initial assessment performed. The patients presenting problems have been discussed, and they/their family are in agreement with the care plan formulated and outlined with them.   I have encouraged them to ask questions as they arise throughout their visit. Provider Notes (Medical Decision Making): 57-year-old male presenting with fever and tachycardia body aches. Well-appearing on exam not in distress. His left hand has healed well appearing puncture wounds without signs of infection. Compartment is soft there is no pain with passive extension but some with flexion of his fingers mainly the index finger. According to the patient the swelling and the pain had significantly improved. No external findings suggestive of infection. Sepsis bundle was initiated, more concerned about COVID-19 rather than soft tissue infection or tendinitis. I had also discussed the case with his hand surgeon Dr. Maria Luisa Goodman and he agrees that the patient can be followed up as an outpatient in its unlikely to be his hand given the examination the history and if there is concern he can get an MRI. I also agree that the patient likely has a viral illness and I do not think is related to his hand. I discussed with the patient the option of getting the MRI tonight or scheduling a virtual appointment with his hand surgeon and getting it as an outpatient once he recovers and the patient prefers to get as an outpatient. He feels better and feels comfortable going home. Procedures:     Critical Care Time:     Vital Signs-Reviewed the patient's vital signs. Reviewed pt's pulse ox reading. EKG: Interpreted by the EP. Time Interpreted:    Rate:    Rhythm:    Interpretation:   Comparison:     Records Reviewed: Nursing Notes (Time of Review: 9:28 PM)  -I am the first provider for this patient.  -I reviewed the vital signs, available nursing notes, past medical history, past surgical history, family history and social history.     Current Facility-Administered Medications   Medication Dose Route Frequency Provider Last Rate Last Dose    sodium chloride (NS) flush 5-10 mL  5-10 mL IntraVENous PRN Di Aguilera MD        lactated ringers bolus infusion 1,000 mL  1,000 mL IntraVENous ONCE Di Aguilera MD 5,172 mL/hr at 11/30/20 2123 1,000 mL at 11/30/20 2123    Followed by   Chandni Reeves lactated ringers bolus infusion 586 mL  586 mL IntraVENous ONCE Di Aguilera MD        cefTRIAXone (ROCEPHIN) 1 g in sterile water (preservative free) 10 mL IV syringe  1 g IntraVENous Q24H Di Aguilera MD   1 g at 11/30/20 2117    vancomycin (VANCOCIN) 1,000 mg in 0.9% sodium chloride 250 mL (VIAL-MATE)  1,000 mg IntraVENous ONCE Di Aguilera MD   1,000 mg at 11/30/20 2123    Followed by   Chandni Reeves vancomycin (VANCOCIN) 750 mg in 0.9% sodium chloride 250 mL (VIAL-MATE)  750 mg IntraVENous ONCE Di Aguilera MD        VANCOMYCIN: Pharmacy to Dose   Other Rx Dosing/Monitoring Di Aguilera MD            Clinical Impression     Clinical Impression: No diagnosis found. Disposition: DC      DISCHARGE NOTE:     Pt has been reexamined. Patient has no new complaints, changes, or physical findings. Care plan outlined and precautions discussed. Results of labs and imaging were reviewed with the patient. All medications were reviewed with the patient; will d/c home with doxycycline. All of pt's questions and concerns were addressed. Patient was instructed and agrees to follow up with hand surgery, as well as to return to the ED upon further deterioration. Patient is ready to go home. This note was dictated utilizing voice recognition software which may lead to typographical errors. I apologize in advance if the situation occurs. If questions arise please do not hesitate to contact me or call our department.     Amira Chou MD  9:28 PM

## 2020-12-01 NOTE — DISCHARGE INSTRUCTIONS
Patient Education        Learning About Coronavirus [phone])  Coronavirus [phone]): Overview  What is coronavirus ([id number])? The coronavirus disease (COVID-19) is caused by a virus. It is an illness that was first found in December 2019. It has since spread worldwide. The virus can cause fever, cough, and trouble breathing. In severe cases, it can cause pneumonia and make it hard to breathe without help. It can cause death. This virus spreads person-to-person through droplets from coughing and sneezing. It can also spread when you are close to someone who is infected. And it can spread when you touch something that has the virus on it, such as a doorknob or a tabletop. Coronaviruses are a large group of viruses. They cause the common cold. They also cause more serious illnesses like [place] respiratory syndrome (MERS) and severe acute respiratory syndrome (SARS). COVID-19 is caused by a novel coronavirus. That means it's a new type that has not been seen in people before. How is COVID-19 treated? Mild illness can be treated at home, but more serious illness needs to be treated in the hospital. Treatment may include medicines to reduce symptoms, plus breathing support such as oxygen therapy or a ventilator. Other treatments, such as antiviral medicines, may help people who have COVID-19. What can you do to protect yourself from COVID-19? The best way to protect yourself from getting sick is to:  · Avoid areas where there is an outbreak. · Avoid contact with people who may be infected. · Avoid crowds and try to stay at least 6 feet away from other people. · Wash your hands often, especially after you cough or sneeze. Use soap and water, and scrub for at least 20 seconds. If soap and water aren't available, use an alcohol-based hand . · Avoid touching your mouth, nose, and eyes. What can you do to avoid spreading the virus to others?   To help avoid spreading the virus to others:  · [employer] your hands often with soap or alcohol-based hand sanitizers. · Cover your mouth with a tissue when you cough or sneeze. Then throw the tissue in the trash. · Use a disinfectant to clean things that you touch often. These include doorknobs, remote controls, phones, and handles on your refrigerator and microwave. And don't forget countertops, tabletops, bathrooms, and computer keyboards. · Wear a cloth face cover if you have to go to public areas. If you know or suspect that you have COVID-19:  · Stay home. Don't go to school, work, or public areas. And don't use public transportation, ride-shares, or taxis unless you have no choice. · Leave your home only if you need to get medical care or testing. But call the doctor's office first so they know you're coming. And wear a face cover. · Limit contact with people in your home. If possible, stay in a separate bedroom and use a separate bathroom. · Wear a face cover whenever you're around other people. It can help stop the spread of the virus when you cough or sneeze. · Clean and disinfect your home every day. Use household  and disinfectant wipes or sprays. Take special care to clean things that you grab with your hands. · Self-isolate until it's safe to be around others again. ? If you have symptoms, it's safe when you haven't had a fever for 3 days and your symptoms have improved and it's been at least 10 days since your symptoms started. ? If you were exposed to the virus but don't have symptoms, it's safe to be around others 14 days after exposure. ? Talk to your doctor about whether you also need testing, especially if you have a weakened immune system. When to call for help  Call 911 anytime you think you may need emergency care. For example, call if:  · You have severe trouble breathing. (You can't talk at all.)  · You have constant chest pain or pressure. · You are severely dizzy or lightheaded.   · You are confused or can't think clearly. · Your face and lips have a blue color. · You passed out (lost consciousness) or are very hard to wake up. Call your doctor now if you develop symptoms such as:  · Shortness of breath. · Fever. · Cough. If you need to get care, call ahead to the doctor's office for instructions before you go. Make sure you wear a face cover to prevent exposing other people to the virus. Where can you get the latest information? The following health organizations are tracking and studying this virus. Their websites contain the most up-to-date information. [employer] also learn what to do if you think you may have been exposed to the virus. · U.S. Centers for Disease Control and Prevention (CDC): The CDC provides updated news about the disease and travel advice. The website also tells you how to prevent the spread of infection. www.cdc.gov  · World Health Organization [facility]): WHO offers information about the virus outbreaks. WHO also has travel advice. www.who.int  Current as of: July 10, 2020               Content Version: 12.6  © 2006-2020 [employer]. Care instructions adapted under license by [employer] (which disclaims liability or warranty for this information). If you have questions about a medical condition or this instruction, always ask your healthcare professional. [address] any warranty or liability for your use of this information.

## 2020-12-01 NOTE — PROGRESS NOTES
Patient contacted regarding [id number] suspect. Discussed COVID-19 related testing which was pending at this time. Test results were pending. Patient informed of results, if available? pending. Outreach made within 2 business days of discharge: Yes    Care Transition Nurse/ Ambulatory Care Manager/ LPN Care Coordinator contacted the patient by telephone to perform post discharge assessment. Verified name and  with patient as identifiers. Provided introduction to self, and explanation of the CTN/ACM/LPN role, and reason for call due to risk factors for infection and/or exposure to COVID-19. Symptoms reviewed with patient who verbalized the following symptoms: fever and sore throat. Due to worsening symptoms encounter was not routed to provider for escalation. Discussed follow-up appointments. If no appointment was previously scheduled, appointment scheduling offered: yes pt decline number to [facility] he will look up number if needed  [facility] follow up appointment(s): No future appointments. Non-[facility] follow up appointment(s): n/a      Advance Care Planning:   Does patient have an Advance Directive: healthcare decision maker on file    Patient has following risk factors of: asthma. CTN/ACM/LPN reviewed discharge instructions, medical action plan and red flags such as increased shortness of breath, increasing fever and signs of decompensation with patient who verbalized understanding. Discussed exposure protocols and quarantine with CDC Guidelines What to do if you are sick with coronavirus disease .  Patient was given an opportunity for questions and concerns. The patient agrees to contact the [facility] exposure line [phone], [facility] department [facility]  ([phone]) and PCP office for questions related to their healthcare. CTN/ACM/LPN provided contact information for future needs.     Reviewed and educated patient on any new and changed medications related to discharge diagnosis. Patient/family/caregiver given information for [employer] and agrees to enroll decline  Patient's preferred e-mail:  n/a  Patient's preferred phone number: n/a  Based on Loop alert triggers, patient will be contacted by nurse care manager for worsening symptoms. Plan for follow-up call in 1-2 days based on severity of symptoms and risk factors.

## 2020-12-02 LAB
ATRIAL RATE: 117 BPM
CALCULATED P AXIS, ECG09: 67 DEGREES
CALCULATED R AXIS, ECG10: 90 DEGREES
CALCULATED T AXIS, ECG11: 58 DEGREES
DIAGNOSIS, 93000: NORMAL
P-R INTERVAL, ECG05: 128 MS
Q-T INTERVAL, ECG07: 310 MS
QRS DURATION, ECG06: 92 MS
QTC CALCULATION (BEZET), ECG08: 432 MS
VENTRICULAR RATE, ECG03: 117 BPM

## 2020-12-03 LAB — SARS-COV-2, COV2NT: NOT DETECTED

## 2020-12-06 LAB
BACTERIA SPEC CULT: NORMAL
BACTERIA SPEC CULT: NORMAL
SERVICE CMNT-IMP: NORMAL
SERVICE CMNT-IMP: NORMAL

## 2020-12-08 ENCOUNTER — PATIENT OUTREACH (OUTPATIENT)
Dept: CASE MANAGEMENT | Age: 35
End: 2020-12-08

## 2020-12-08 NOTE — PROGRESS NOTES
Patient contacted regarding COVID-19 risk and screening. Discussed COVID-19 related testing which was available at this time. Test results were negative. Patient informed of results, if available? yes     Care Transition Nurse/ Ambulatory Care Manager/ LPN Care Coordinator contacted the patient by telephone to perform follow-up assessment. Verified name and  with patient as identifiers. Patient has following risk factors of: asthma. Symptoms reviewed with patient who verbalized the following symptoms: no new symptoms. Due to no new or worsening symptoms encounter was not routed to provider for escalation. Education provided regarding infection prevention, and signs and symptoms of COVID-19 and when to seek medical attention with patient who verbalized understanding. Discussed exposure protocols and quarantine from [address] you at higher risk for severe illness  and given an opportunity for questions and concerns. The patient agrees to contact the COVID-19 hotline [phone] or PCP office for questions related to their healthcare. CTN/ACM/LPN provided contact information for future reference. From CDC: Are you at higher risk for severe illness?  Wash your hands often.  Avoid close contact (6 feet, which is about two arm lengths) with people who are sick.  Put distance between yourself and other people if COVID-19 is spreading in your community.  Clean and disinfect frequently touched surfaces.  Avoid all cruise travel and non-essential air travel.  Call your healthcare professional if you have concerns about COVID-19 and your underlying condition or if you are sick. For more information on steps you can take to protect yourself, see CDC's How to [place] for follow-up call in 5-7 days based on severity of symptoms and risk factors.

## 2020-12-15 ENCOUNTER — PATIENT OUTREACH (OUTPATIENT)
Dept: CASE MANAGEMENT | Age: 35
End: 2020-12-15

## 2020-12-15 NOTE — PROGRESS NOTES
Patient resolved from [address] episode on 12/15/2020  Discussed COVID-19 related testing which was available at this time. Test results were negative. Patient informed of results, if available? yes     Patient/family has been provided the following resources and education related to COVID-19:                         Signs, symptoms and red flags related to COVID-19            CDC exposure and quarantine guidelines            [facility] exposure contact - [phone]            Contact for their local Department of Health                 Patient currently reports that the following symptoms have improved:  no symptoms. No further outreach scheduled with this CTN/ACM/LPN/HC/ MA. Episode of Care resolved. Patient has this CTN/ACM/LPN/HC/MA contact information if future needs arise.

## 2022-03-27 ENCOUNTER — APPOINTMENT (OUTPATIENT)
Dept: CT IMAGING | Age: 37
End: 2022-03-27
Attending: STUDENT IN AN ORGANIZED HEALTH CARE EDUCATION/TRAINING PROGRAM
Payer: COMMERCIAL

## 2022-03-27 ENCOUNTER — APPOINTMENT (OUTPATIENT)
Dept: GENERAL RADIOLOGY | Age: 37
End: 2022-03-27
Attending: STUDENT IN AN ORGANIZED HEALTH CARE EDUCATION/TRAINING PROGRAM
Payer: COMMERCIAL

## 2022-03-27 ENCOUNTER — HOSPITAL ENCOUNTER (EMERGENCY)
Age: 37
Discharge: HOME | End: 2022-03-27
Attending: STUDENT IN AN ORGANIZED HEALTH CARE EDUCATION/TRAINING PROGRAM
Payer: COMMERCIAL

## 2022-03-27 VITALS
TEMPERATURE: 98.7 F | OXYGEN SATURATION: 100 % | HEART RATE: 114 BPM | BODY MASS INDEX: 29.82 KG/M2 | SYSTOLIC BLOOD PRESSURE: 144 MMHG | HEIGHT: 67 IN | WEIGHT: 190 LBS | DIASTOLIC BLOOD PRESSURE: 107 MMHG | RESPIRATION RATE: 18 BRPM

## 2022-03-27 DIAGNOSIS — S82.142A TIBIAL PLATEAU FRACTURE, LEFT, CLOSED, INITIAL ENCOUNTER: Primary | ICD-10-CM

## 2022-03-27 PROCEDURE — 74011000250 HC RX REV CODE- 250: Performed by: STUDENT IN AN ORGANIZED HEALTH CARE EDUCATION/TRAINING PROGRAM

## 2022-03-27 PROCEDURE — 73562 X-RAY EXAM OF KNEE 3: CPT

## 2022-03-27 PROCEDURE — 70450 CT HEAD/BRAIN W/O DYE: CPT

## 2022-03-27 PROCEDURE — 99284 EMERGENCY DEPT VISIT MOD MDM: CPT

## 2022-03-27 PROCEDURE — 73502 X-RAY EXAM HIP UNI 2-3 VIEWS: CPT

## 2022-03-27 PROCEDURE — 74011250637 HC RX REV CODE- 250/637: Performed by: STUDENT IN AN ORGANIZED HEALTH CARE EDUCATION/TRAINING PROGRAM

## 2022-03-27 RX ORDER — LIDOCAINE 4 G/100G
1 PATCH TOPICAL ONCE
Status: DISCONTINUED | OUTPATIENT
Start: 2022-03-27 | End: 2022-03-28 | Stop reason: HOSPADM

## 2022-03-27 RX ORDER — ACETAMINOPHEN 500 MG
1000 TABLET ORAL ONCE
Status: COMPLETED | OUTPATIENT
Start: 2022-03-27 | End: 2022-03-27

## 2022-03-27 RX ORDER — METHOCARBAMOL 500 MG/1
1000 TABLET, FILM COATED ORAL ONCE
Status: COMPLETED | OUTPATIENT
Start: 2022-03-27 | End: 2022-03-27

## 2022-03-27 RX ORDER — IBUPROFEN 600 MG/1
600 TABLET ORAL ONCE
Status: COMPLETED | OUTPATIENT
Start: 2022-03-27 | End: 2022-03-27

## 2022-03-27 RX ADMIN — METHOCARBAMOL 1000 MG: 500 TABLET ORAL at 18:35

## 2022-03-27 RX ADMIN — ACETAMINOPHEN 1000 MG: 500 TABLET ORAL at 18:35

## 2022-03-27 RX ADMIN — IBUPROFEN 600 MG: 600 TABLET ORAL at 19:33
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
## 2022-03-28 NOTE — DISCHARGE INSTRUCTIONS
You have a lateral tibial plateau fracture. Yours is broken up into multiple different pieces and therefore likely requires operative repair. Because this is a complex fracture with high risk of causing future knee problems it is very important that you see an orthopedic surgeon. Would recommend going to the Ortho clinic at [facility] as they specialize in this type of fracture repair. Do not put any weight or pressure on your left lower extremity. Use crutches to get around. Take 600 mg of ibuprofen every 6 hours and Tylenol 500 mg every 4-6 hours as needed for pain or discomfort. Also use alternative therapies such as ice, elevation and compression.

## 2022-03-28 NOTE — ED NOTES
I have reviewed discharge instructions with the patient and spouse. The patient and spouse verbalized understanding. Discharge medications reviewed with patient and spouse and appropriate educational materials and side effects teaching were provided. Patient armband removed and given to patient to take home.   Patient was informed of the privacy risks if armband lost or stolen

## 2023-03-27 ENCOUNTER — HOSPITAL ENCOUNTER (EMERGENCY)
Facility: HOSPITAL | Age: 38
Discharge: HOME | End: 2023-03-28
Attending: EMERGENCY MEDICINE
Payer: COMMERCIAL

## 2023-03-27 VITALS
HEART RATE: 74 BPM | SYSTOLIC BLOOD PRESSURE: 154 MMHG | WEIGHT: 200 LBS | RESPIRATION RATE: 20 BRPM | OXYGEN SATURATION: 100 % | BODY MASS INDEX: 32.14 KG/M2 | HEIGHT: 66 IN | TEMPERATURE: 98.4 F | DIASTOLIC BLOOD PRESSURE: 99 MMHG
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
## 2023-03-27 DIAGNOSIS — S01.81XA FACIAL LACERATION, INITIAL ENCOUNTER: Primary | ICD-10-CM

## 2023-03-27 PROCEDURE — 12011 RPR F/E/E/N/L/M 2.5 CM/<: CPT

## 2023-03-27 PROCEDURE — 99283 EMERGENCY DEPT VISIT LOW MDM: CPT

## 2023-03-27 RX ORDER — AMOXICILLIN AND CLAVULANATE POTASSIUM 875; 125 MG/1; MG/1
1 TABLET, FILM COATED ORAL
Status: COMPLETED | OUTPATIENT
Start: 2023-03-27 | End: 2023-03-28

## 2023-03-27 ASSESSMENT — PAIN SCALES - GENERAL: PAINLEVEL_OUTOF10: 5

## 2023-03-27 ASSESSMENT — PAIN - FUNCTIONAL ASSESSMENT: PAIN_FUNCTIONAL_ASSESSMENT: 0-10

## 2023-03-28 PROCEDURE — 6370000000 HC RX 637 (ALT 250 FOR IP): Performed by: EMERGENCY MEDICINE

## 2023-03-28 RX ORDER — AMOXICILLIN AND CLAVULANATE POTASSIUM 875; 125 MG/1; MG/1
1 TABLET, FILM COATED ORAL 2 TIMES DAILY
Qty: 14 TABLET | Refills: 0 | Status: SHIPPED | OUTPATIENT
Start: 2023-03-28 | End: 2023-04-04

## 2023-03-28 RX ADMIN — AMOXICILLIN AND CLAVULANATE POTASSIUM 1 TABLET: 875; 125 TABLET, FILM COATED ORAL at 00:09
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
## 2023-03-28 NOTE — ED PROVIDER NOTES
[address] [facility]  [phone]  Schedule an appointment as soon as possible for a visit in 2 days      Belle Russell MD  [phone] [id number] [phone]          DISCHARGE MEDICATIONS:  Discharge Medication List as of 3/28/2023 12:20 AM        START taking these medications    Details   amoxicillin-clavulanate (AUGMENTIN) 875-125 MG per tablet Take 1 tablet by mouth 2 times daily for 7 days, Disp-14 tablet, R-0Normal           Controlled Substances Monitoring:     No flowsheet data found.     (Please note that portions of this note were completed with a voice recognition program.  Efforts were made to edit the dictations but occasionally words are mis-transcribed.)    Wanda Luu MD (electronically signed)  Attending Emergency Physician          Dilma Moreno MD  03/28/23 6423

## 2023-03-28 NOTE — ED NOTES
Discharge instructions reviewed with patient. Patient verbalized understanding. Patient provided with additional gauze if needed. Patient ambulated from ED treatment area independently.       Re Isidro RN  03/28/23 6946

## 2023-03-28 NOTE — DISCHARGE INSTRUCTIONS
Return for pain, redness/swelling, drainage, any fever/chills, shortness of breath, vomiting, decreased fluid intake, weakness, numbness, dizziness, or any change or concerns.

## (undated) DEVICE — NDL SPNE MANAN 22GX6IN --

## (undated) DEVICE — MEDIA CONTRAST 10ML 200MG/ML 41%

## (undated) DEVICE — (D)BNDG ADHESIVE FABRIC 3/4X3 -- DISC BY MFR USE ITEM 357960

## (undated) DEVICE — TRAY MYEL SFTY +

## (undated) DEVICE — (D)SYR 10ML 1/5ML GRAD NSAF -- PKGING CHANGE USE ITEM 338027